# Patient Record
Sex: FEMALE | Race: WHITE | Employment: OTHER | RURAL
[De-identification: names, ages, dates, MRNs, and addresses within clinical notes are randomized per-mention and may not be internally consistent; named-entity substitution may affect disease eponyms.]

---

## 2019-05-06 ENCOUNTER — OFFICE VISIT (OUTPATIENT)
Dept: NEUROLOGY | Age: 84
End: 2019-05-06

## 2019-05-06 VITALS — HEART RATE: 80 BPM | OXYGEN SATURATION: 98 % | DIASTOLIC BLOOD PRESSURE: 51 MMHG | SYSTOLIC BLOOD PRESSURE: 131 MMHG

## 2019-05-06 DIAGNOSIS — G25.81 RLS (RESTLESS LEGS SYNDROME): Primary | ICD-10-CM

## 2019-05-06 RX ORDER — LEVOTHYROXINE SODIUM 50 UG/1
TABLET ORAL
COMMUNITY

## 2019-05-06 RX ORDER — ROPINIROLE 1 MG/1
TABLET, FILM COATED ORAL
Qty: 180 TAB | Refills: 3 | Status: SHIPPED | OUTPATIENT
Start: 2019-05-06 | End: 2019-11-26

## 2019-05-06 RX ORDER — MELATONIN
DAILY
COMMUNITY

## 2019-05-06 RX ORDER — MONTELUKAST SODIUM 4 MG/1
1 TABLET, CHEWABLE ORAL 3 TIMES DAILY
COMMUNITY
End: 2021-08-04

## 2019-05-06 RX ORDER — AZELASTINE 1 MG/ML
1 SPRAY, METERED NASAL 2 TIMES DAILY
COMMUNITY
End: 2021-08-04

## 2019-05-06 RX ORDER — MULTIVIT WITH MINERALS/HERBS
1 TABLET ORAL DAILY
COMMUNITY

## 2019-05-06 RX ORDER — GABAPENTIN 300 MG/1
300 CAPSULE ORAL 3 TIMES DAILY
COMMUNITY
End: 2021-08-04

## 2019-05-06 RX ORDER — FLAXSEED OIL 1000 MG
CAPSULE ORAL
COMMUNITY

## 2019-05-06 RX ORDER — BISMUTH SUBSALICYLATE 262 MG
2 TABLET,CHEWABLE ORAL DAILY
COMMUNITY
End: 2021-08-04

## 2019-05-06 NOTE — PROGRESS NOTES
HISTORY OF PRESENT ILLNESS  Mookie oSn is a 80 y.o. female. Patient is an 51-year-old woman who does not who comes in today complaining of What sounds like restless leg syndrome, she has a terrible discomfort the moment she gets in the bed and it just gets worse at night. She has been put on gabapentin 1800 mg nightly and it sedates her significantly at night and during the day. She is not sure whether it is helping with the restless legs or not. She gives a classic story for restless leg syndrome, the moment she gets in the bed and begin to ache and hurt and she feels she has to move them and becomes very very disconcerting. She is worried that the medication I put her on is not going to get approved as they had a great deal of trouble getting the gabapentin approved. She denies any bowel or bladder complaints, she denies any problems with her legs during the day when she is walking. When she sits down she feels some of the discomfort. Review of Systems   Constitutional:        Review of systems is positive for bilateral hearing loss moderate, she has some snoring. Complete review of systems done all is negative       Physical Exam  Constitutional: Oriented to person, place, and time, appears well-developed and well-nourished. No distress. HENT:   Head: Normocephalic and atraumatic. Mouth/Throat: Oropharynx is clear and moist. No oropharyngeal exudate. Eyes: Conjunctivae and EOM are normal. Pupils are equal, round, and reactive to light. No scleral icterus. Neck: Normal range of motion. Neck supple. No thyromegaly present. Cardiovascular: Normal rate, regular rhythm and normal heart sounds. Musculoskeletal: Normal range of motion, exhibits no edema, tenderness or deformity. Lymphadenopathy: no cervical adenopathy. Neurological: Alert and oriented to person, place, and time. Normal strength and normal reflexes. Displays no atrophy and no tremor.    No cranial nerve deficit or sensory deficit. Exhibits normal muscle tone. Displays a negative Romberg sign, no seizure activity. Coordination normal, gait normal.   No Babinski's sign on the right side. No Babinski's sign on the left side. Speech, language and mentation are normal  Visual fields are full to confrontation, funduscopic exam reveals flat discs, the retina and vasculature are normal   Skin: Skin is warm and dry. No rash noted, not diaphoretic. No erythema. Psychiatric: Normal mood and affect,  behavior is normal. Judgment and thought content normal.   Vitals reviewed. Current Outpatient Medications on File Prior to Visit   Medication Sig Dispense Refill    levothyroxine (SYNTHROID) 50 mcg tablet Take  by mouth Daily (before breakfast).  azelastine (ASTELIN) 137 mcg (0.1 %) nasal spray 1 Spray two (2) times a day. Use in each nostril as directed      colestipol (COLESTID) 1 gram tablet Take 1 g by mouth three (3) times daily.  teriparatide (FORTEO) 20 mcg/dose - 600 mcg/2.4 mL pnij injection 20 mcg by SubCUTAneous route daily.  multivitamin (ONE A DAY) tablet Take 1 Tab by mouth daily.  flaxseed oil 1,000 mg cap Take  by mouth.  b complex vitamins tablet Take 1 Tab by mouth daily.  calcium citrate/vitamin D3 (CALCIUM CITRATE + D PO) Take  by mouth daily.  cholecalciferol (VITAMIN D3) 1,000 unit tablet Take  by mouth daily.  gabapentin (NEURONTIN) 300 mg capsule Take 300 mg by mouth three (3) times daily. Takes 6 tabs at night for restless legs       No current facility-administered medications on file prior to visit. Past Medical History:   Diagnosis Date    Menopause      No family history on file.   Social History     Tobacco Use    Smoking status: Not on file   Substance Use Topics    Alcohol use: Not on file    Drug use: Not on file     /51   Pulse 80   SpO2 98%     ASSESSMENT and PLAN  RESTLESS LEGS  SYNDROME  This patient has classic restless leg syndrome, I am going to taper her gabapentin as it is not particularly effective and restless legs and it has high profile side effects at higher doses. I will taper it fairly quickly. I am to start her on Ropinirole 1 mg nightly, if that does not do the job she can move up to 2 mg at night. I have told her if she gets nauseated from the medication she can cut back, I have asked her to call us when she is on a steady dose and tell us whether it is helping. I will plan to see her back in 4 months.   COPD  Mild unrelated to above problem

## 2019-05-06 NOTE — PATIENT INSTRUCTIONS

## 2019-06-05 ENCOUNTER — TELEPHONE (OUTPATIENT)
Dept: NEUROLOGY | Age: 84
End: 2019-06-05

## 2019-10-15 RX ORDER — ROTIGOTINE 3 MG/24H
1 PATCH, EXTENDED RELEASE TRANSDERMAL EVERY 24 HOURS
COMMUNITY
Start: 2019-09-27

## 2019-10-15 RX ORDER — PREGABALIN 50 MG/1
25 CAPSULE ORAL DAILY
Refills: 2 | COMMUNITY
Start: 2019-10-04

## 2019-10-15 NOTE — PROGRESS NOTES
Odilon Dailey is a 80 y.o. female new patient today referred by Dr Mariaelena العراقي for [facility] [address].

## 2019-10-17 ENCOUNTER — OFFICE VISIT (OUTPATIENT)
Dept: ONCOLOGY | Age: 84
End: 2019-10-17

## 2019-10-17 VITALS
BODY MASS INDEX: 23.15 KG/M2 | WEIGHT: 122.6 LBS | HEIGHT: 61 IN | TEMPERATURE: 97.8 F | SYSTOLIC BLOOD PRESSURE: 135 MMHG | RESPIRATION RATE: 16 BRPM | OXYGEN SATURATION: 97 % | HEART RATE: 64 BPM | DIASTOLIC BLOOD PRESSURE: 61 MMHG

## 2019-10-17 DIAGNOSIS — D64.9 ANEMIA, UNSPECIFIED TYPE: Primary | ICD-10-CM

## 2019-10-17 RX ORDER — ASPIRIN 81 MG/1
384 TABLET ORAL DAILY
COMMUNITY
End: 2019-11-20

## 2019-10-17 NOTE — PROGRESS NOTES
Reason for Visit:   Alexandra Ramirez is a 80 y.o. female who is seen for eval of Erythromelaglia    Treatment History:   Dx: Anemia, Erythromelaglia    History of Present Illness:   79 yo here for eval of above. Began noticing redness and burning in her feet during the summer--Dr Driscoll prescribed asa which did help. Here for eval of associated conditions. Has anemia. Last colonoscopy was 2003--won't have another. Says the procedure was horrible. Lost weight during the summer but hasn't lost further. No change in waist size. No problems with infections. No early satiety. No unusual bleeding or bruising. Past Medical History:   Diagnosis Date    Asthma     Menopause       Past Surgical History:   Procedure Laterality Date    HX APPENDECTOMY      HX BREAST BIOPSY Left 1968    Benign    HX HEENT      tonsil out  at age 25      Social History     Tobacco Use    Smoking status: Former Smoker     Packs/day: 0.50     Years: 25.00     Pack years: 12.50     Types: Cigarettes    Smokeless tobacco: Never Used   Substance Use Topics    Alcohol use: Not Currently      Family History   Problem Relation Age of Onset    Dementia Mother     Osteoporosis Mother     Diabetes Father     Stroke Father     Arthritis-osteo Maternal Aunt     Stroke Paternal Grandfather      Current Outpatient Medications   Medication Sig    aspirin delayed-release 81 mg tablet Take 384 mg by mouth daily. Indications: taking 4 81 mg asa daily    LYRICA 50 mg capsule Take 1 Tab by mouth nightly.  NEUPRO 3 mg/24 hour patch Apply 1 Patch to affected area every twenty-four (24) hours.  levothyroxine (SYNTHROID) 50 mcg tablet Take  by mouth Daily (before breakfast).  azelastine (ASTELIN) 137 mcg (0.1 %) nasal spray 1 Spray two (2) times a day. Use in each nostril as directed    colestipol (COLESTID) 1 gram tablet Take 1 g by mouth three (3) times daily.     teriparatide (FORTEO) 20 mcg/dose - 600 mcg/2.4 mL pnij injection 20 mcg by SubCUTAneous route daily.  multivitamin (ONE A DAY) tablet Take 1 Tab by mouth daily.  flaxseed oil 1,000 mg cap Take  by mouth.  b complex vitamins tablet Take 1 Tab by mouth daily.  calcium citrate/vitamin D3 (CALCIUM CITRATE + D PO) Take  by mouth daily.  cholecalciferol (VITAMIN D3) 1,000 unit tablet Take  by mouth daily.  gabapentin (NEURONTIN) 300 mg capsule Take 300 mg by mouth three (3) times daily. Takes 6 tabs at night for restless legs    rOPINIRole (REQUIP) 1 mg tablet 1 po qhs prn RLs, may increase to 2 qhs if needed  Indications: Extreme Discomfort in Calves when Sitting or Lying Down     No current facility-administered medications for this visit. Allergies   Allergen Reactions    Other Medication Angioedema     Crest toothpaste    Seafood Anaphylaxis    Shellfish Derived Anaphylaxis    Soy Anaphylaxis    Soy Protein Anaphylaxis    Bacitracin Unknown (comments)     \"Prevents healing\"    Beef Containing Products Nausea and Vomiting    Beef Extract Diarrhea     Other reaction(s): Nausea and Vomiting    Milk Containing Products Nausea and Vomiting    Ropinirole Palpitations    Watermelon Nausea and Vomiting    Adhesive Tape-Silicones Rash    Sulfa (Sulfonamide Antibiotics) Rash        Review of Systems: A complete review of systems was obtained, negative except as described above.     Physical Exam:     Visit Vitals  /61   Pulse 64   Temp 97.8 °F (36.6 °C) (Oral)   Resp 16   Ht 5' 1\" (1.549 m)   Wt 122 lb 9.6 oz (55.6 kg)   SpO2 97%   BMI 23.17 kg/m²     ECOG PS: 0  General: No distress  Eyes: PERRLA, anicteric sclerae  HENT: Atraumatic, OP clear  Neck: Supple  Lymphatic: No cervical, supraclavicular, or inguinal adenopathy  Respiratory: CTAB, normal respiratory effort  CV: Normal rate, regular rhythm, no murmurs, no peripheral edema  GI: Soft, nontender, nondistended, no masses, no hepatomegaly, no splenomegaly  MS: Normal gait and station. Digits without clubbing or cyanosis. Skin: No rashes, ecchymoses, or petechiae. Normal temperature, turgor, and texture. Psych: Alert, oriented, appropriate affect, normal judgment/insight    Results:     Lab Results   Component Value Date/Time    WBC 9.7 05/13/2019 08:17 AM    HGB 10.7 (L) 05/13/2019 08:17 AM    HCT 32.7 (L) 05/13/2019 08:17 AM    PLATELET 578 69/26/6714 08:17 AM    MCV 96.2 05/13/2019 08:17 AM    ABS. NEUTROPHILS 7.6 05/13/2019 08:17 AM     Lab Results   Component Value Date/Time    Sodium 143 05/13/2019 08:17 AM    Potassium 3.6 05/13/2019 08:17 AM    Chloride 105 05/13/2019 08:17 AM    CO2 28 05/13/2019 08:17 AM    Glucose 110 (H) 05/13/2019 08:17 AM    BUN 18 05/13/2019 08:17 AM    Creatinine 1.41 (H) 05/13/2019 08:17 AM    GFR est AA 43 (L) 05/13/2019 08:17 AM    GFR est non-AA 36 (L) 05/13/2019 08:17 AM    Calcium 9.4 05/13/2019 08:17 AM     Lab Results   Component Value Date/Time    Bilirubin, total 0.3 05/13/2019 08:17 AM    ALT (SGPT) 23 05/13/2019 08:17 AM    AST (SGOT) 18 05/13/2019 08:17 AM    Alk. phosphatase 55 05/13/2019 08:17 AM    Protein, total 6.7 05/13/2019 08:17 AM    Albumin 3.2 (L) 05/13/2019 08:17 AM    Globulin 3.5 05/13/2019 08:17 AM           Assessment:   1) Anemia  2) Erythromelaglia      Plan:   1) Will do work up--MGUS/iron/b12/folate deficiency. Check retic for bone marrow response to the anemia. Will bring back in a month to discuss. 2) Can be associated with MPD--no evidence in peripheral blood--if needs marrow for the anemia work up it could show evidence of a MPD. Wait for labs to decide.     Signed By: Janette Snider MD

## 2019-11-13 NOTE — PROGRESS NOTES
Alirio Ruiz is a 80 y.o. female who is seen for f/u of [facility].   Denies complaints.         Patient had;  Mammo 4/25/2019  DEXA 6/19/2018

## 2019-11-14 ENCOUNTER — OFFICE VISIT (OUTPATIENT)
Dept: ONCOLOGY | Age: 84
End: 2019-11-14

## 2019-11-14 VITALS
SYSTOLIC BLOOD PRESSURE: 125 MMHG | HEIGHT: 61 IN | HEART RATE: 75 BPM | BODY MASS INDEX: 23.3 KG/M2 | TEMPERATURE: 97.8 F | WEIGHT: 123.4 LBS | RESPIRATION RATE: 16 BRPM | DIASTOLIC BLOOD PRESSURE: 70 MMHG | OXYGEN SATURATION: 97 %

## 2019-11-14 DIAGNOSIS — D64.9 ANEMIA, UNSPECIFIED TYPE: ICD-10-CM

## 2019-11-14 DIAGNOSIS — D64.9 ANEMIA, UNSPECIFIED TYPE: Primary | ICD-10-CM

## 2019-11-14 NOTE — PROGRESS NOTES
Reason for Visit:   Mindi Fitzgerald is a 80 y.o. female who is seen for eval of Erythromelaglia     Treatment History:   Dx: Anemia, Erythromelaglia    History of Present Illness:   Doing ok, no major changes. Labs were good in that no abnormalities were found to explain the anemia or erythromelaglia. But we still need to do more lab work to rule out myeloma and mds. Past Medical History:   Diagnosis Date    Asthma     Menopause       Past Surgical History:   Procedure Laterality Date    HX APPENDECTOMY      HX BREAST BIOPSY Left 1968    Benign    HX HEENT      tonsil out  at age 25      Social History     Tobacco Use    Smoking status: Former Smoker     Packs/day: 0.50     Years: 25.00     Pack years: 12.50     Types: Cigarettes    Smokeless tobacco: Never Used   Substance Use Topics    Alcohol use: Not Currently      Family History   Problem Relation Age of Onset    Dementia Mother     Osteoporosis Mother     Diabetes Father     Stroke Father     Arthritis-osteo Maternal Aunt     Stroke Paternal Grandfather      Current Outpatient Medications   Medication Sig    aspirin delayed-release 81 mg tablet Take 384 mg by mouth daily. Indications: taking 4 81 mg asa daily    LYRICA 50 mg capsule Take 1 Tab by mouth nightly.  NEUPRO 3 mg/24 hour patch Apply 1 Patch to affected area every twenty-four (24) hours.  levothyroxine (SYNTHROID) 50 mcg tablet Take  by mouth Daily (before breakfast).  azelastine (ASTELIN) 137 mcg (0.1 %) nasal spray 1 Spray two (2) times a day. Use in each nostril as directed    colestipol (COLESTID) 1 gram tablet Take 1 g by mouth three (3) times daily.  teriparatide (FORTEO) 20 mcg/dose - 600 mcg/2.4 mL pnij injection 20 mcg by SubCUTAneous route daily.  multivitamin (ONE A DAY) tablet Take 1 Tab by mouth daily.  flaxseed oil 1,000 mg cap Take  by mouth.  b complex vitamins tablet Take 1 Tab by mouth daily.     calcium citrate/vitamin D3 (CALCIUM CITRATE + D PO) Take  by mouth daily.  cholecalciferol (VITAMIN D3) 1,000 unit tablet Take  by mouth daily.  gabapentin (NEURONTIN) 300 mg capsule Take 300 mg by mouth three (3) times daily. Takes 6 tabs at night for restless legs    rOPINIRole (REQUIP) 1 mg tablet 1 po qhs prn RLs, may increase to 2 qhs if needed  Indications: Extreme Discomfort in Calves when Sitting or Lying Down     No current facility-administered medications for this visit. Allergies   Allergen Reactions    Other Medication Angioedema     Crest toothpaste    Seafood Anaphylaxis    Shellfish Derived Anaphylaxis    Soy Anaphylaxis    Soy Protein Anaphylaxis    Bacitracin Unknown (comments)     \"Prevents healing\"    Beef Containing Products Nausea and Vomiting    Beef Extract Diarrhea     Other reaction(s): Nausea and Vomiting    Milk Containing Products Nausea and Vomiting    Ropinirole Palpitations    Watermelon Nausea and Vomiting    Adhesive Tape-Silicones Rash    Sulfa (Sulfonamide Antibiotics) Rash        Review of Systems: A complete review of systems was obtained, negative except as described above. Physical Exam:   There were no vitals taken for this visit. ECOG PS: 1  General: No distress  Eyes: PERRLA, anicteric sclerae  HENT: Atraumatic, OP clear  Neck: Supple  Lymphatic: No cervical, supraclavicular, or inguinal adenopathy  Respiratory: CTAB, normal respiratory effort  CV: Normal rate, regular rhythm, no murmurs, no peripheral edema  GI: Soft, nontender, nondistended, no masses, no hepatomegaly, no splenomegaly  MS: Normal gait and station. Digits without clubbing or cyanosis. Skin: No rashes, ecchymoses, or petechiae. Normal temperature, turgor, and texture.   Psych: Alert, oriented, appropriate affect, normal judgment/insight    Results:     Lab Results   Component Value Date/Time    WBC 10.4 10/17/2019 02:50 PM    HGB 10.7 (L) 10/17/2019 02:50 PM    HCT 33.2 (L) 10/17/2019 02:50 PM    PLATELET 463 10/17/2019 02:50 PM    MCV 97.4 10/17/2019 02:50 PM    ABS. NEUTROPHILS 7.1 10/17/2019 02:50 PM     Lab Results   Component Value Date/Time    Sodium 139 10/17/2019 02:50 PM    Potassium 4.6 10/17/2019 02:50 PM    Chloride 103 10/17/2019 02:50 PM    CO2 28 10/17/2019 02:50 PM    Glucose 84 10/17/2019 02:50 PM    BUN 28 (H) 10/17/2019 02:50 PM    Creatinine 1.28 (H) 10/17/2019 02:50 PM    GFR est AA 48 (L) 10/17/2019 02:50 PM    GFR est non-AA 40 (L) 10/17/2019 02:50 PM    Calcium 9.4 10/17/2019 02:50 PM     Lab Results   Component Value Date/Time    Bilirubin, total 0.2 10/17/2019 02:50 PM    ALT (SGPT) 26 10/17/2019 02:50 PM    AST (SGOT) 23 10/17/2019 02:50 PM    Alk. phosphatase 82 10/17/2019 02:50 PM    Protein, total 6.9 10/17/2019 02:50 PM    Protein, total 6.4 10/17/2019 02:50 PM    Albumin 3.6 10/17/2019 02:50 PM    Globulin 3.3 10/17/2019 02:50 PM     SPEP/PAUL/FLC--no monoclonal process  Iron, B12, Folate all normal  Retic Count inappropriately normal    Colonoscopy 2013--clear--will not have another    Assessment:   1) Anemia  2) Erythromelaglia        Plan:   1) Get UPEP/PAUL as well as Bone Marrow biopsy to rule out Myeloma and MDS--back in one month to discuss testing. 2) Can be associated with MPD--no evidence in peripheral blood.  See if Marrow has any findings      Signed By: Margie Prajapati MD

## 2019-11-26 ENCOUNTER — HOSPITAL ENCOUNTER (OUTPATIENT)
Dept: CT IMAGING | Age: 84
Discharge: HOME | End: 2019-11-26
Attending: INTERNAL MEDICINE
Payer: MEDICARE

## 2019-11-26 VITALS
HEART RATE: 67 BPM | DIASTOLIC BLOOD PRESSURE: 60 MMHG | WEIGHT: 122 LBS | HEIGHT: 61 IN | TEMPERATURE: 98 F | BODY MASS INDEX: 23.03 KG/M2 | RESPIRATION RATE: 16 BRPM | SYSTOLIC BLOOD PRESSURE: 128 MMHG | OXYGEN SATURATION: 97 %

## 2019-11-26 DIAGNOSIS — D64.9 ANEMIA, UNSPECIFIED TYPE: ICD-10-CM

## 2019-11-26 LAB
BASOPHILS # BLD: 0 K/UL (ref 0–0.1)
BASOPHILS NFR BLD: 1 % (ref 0–1)
DIFFERENTIAL METHOD BLD: ABNORMAL
EOSINOPHIL # BLD: 0.1 K/UL (ref 0–0.4)
EOSINOPHIL NFR BLD: 1 % (ref 0–7)
ERYTHROCYTE [DISTWIDTH] IN BLOOD BY AUTOMATED COUNT: 13.9 % (ref 11.5–14.5)
HCT VFR BLD AUTO: 34 % (ref 35–47)
HGB BLD-MCNC: 10.9 G/DL (ref 11.5–16)
IMM GRANULOCYTES # BLD AUTO: 0 K/UL (ref 0–0.04)
IMM GRANULOCYTES NFR BLD AUTO: 0 % (ref 0–0.5)
LYMPHOCYTES # BLD: 1.6 K/UL (ref 0.8–3.5)
LYMPHOCYTES NFR BLD: 21 % (ref 12–49)
MCH RBC QN AUTO: 32.3 PG (ref 26–34)
MCHC RBC AUTO-ENTMCNC: 32.1 G/DL (ref 30–36.5)
MCV RBC AUTO: 100.9 FL (ref 80–99)
MONOCYTES # BLD: 0.6 K/UL (ref 0–1)
MONOCYTES NFR BLD: 8 % (ref 5–13)
NEUTS SEG # BLD: 5.2 K/UL (ref 1.8–8)
NEUTS SEG NFR BLD: 69 % (ref 32–75)
NRBC # BLD: 0 K/UL (ref 0–0.01)
NRBC BLD-RTO: 0 PER 100 WBC
PLATELET # BLD AUTO: 225 K/UL (ref 150–400)
PMV BLD AUTO: 10.5 FL (ref 8.9–12.9)
RBC # BLD AUTO: 3.37 M/UL (ref 3.8–5.2)
WBC # BLD AUTO: 7.5 K/UL (ref 3.6–11)

## 2019-11-26 PROCEDURE — 88305 TISSUE EXAM BY PATHOLOGIST: CPT

## 2019-11-26 PROCEDURE — 88264 CHROMOSOME ANALYSIS 20-25: CPT

## 2019-11-26 PROCEDURE — 77030003375 HC MRK BIOP BEEK -A

## 2019-11-26 PROCEDURE — 77030028872 HC BN BIOP NDL ON CNTRL TY TELE -C

## 2019-11-26 PROCEDURE — 88185 FLOWCYTOMETRY/TC ADD-ON: CPT

## 2019-11-26 PROCEDURE — 88311 DECALCIFY TISSUE: CPT

## 2019-11-26 PROCEDURE — 38221 DX BONE MARROW BIOPSIES: CPT

## 2019-11-26 PROCEDURE — 74011250636 HC RX REV CODE- 250/636: Performed by: RADIOLOGY

## 2019-11-26 PROCEDURE — 36415 COLL VENOUS BLD VENIPUNCTURE: CPT

## 2019-11-26 PROCEDURE — 85025 COMPLETE CBC W/AUTO DIFF WBC: CPT

## 2019-11-26 PROCEDURE — 88237 TISSUE CULTURE BONE MARROW: CPT

## 2019-11-26 PROCEDURE — 88374 M/PHMTRC ALYS ISHQUANT/SEMIQ: CPT

## 2019-11-26 PROCEDURE — 88313 SPECIAL STAINS GROUP 2: CPT

## 2019-11-26 PROCEDURE — 77030014115

## 2019-11-26 PROCEDURE — 88184 FLOWCYTOMETRY/ TC 1 MARKER: CPT

## 2019-11-26 PROCEDURE — 77030003666 HC NDL SPINAL BD -A

## 2019-11-26 RX ORDER — MIDAZOLAM HYDROCHLORIDE 1 MG/ML
5 INJECTION, SOLUTION INTRAMUSCULAR; INTRAVENOUS
Status: DISCONTINUED | OUTPATIENT
Start: 2019-11-26 | End: 2019-11-26

## 2019-11-26 RX ORDER — FENTANYL CITRATE 50 UG/ML
100 INJECTION, SOLUTION INTRAMUSCULAR; INTRAVENOUS
Status: DISCONTINUED | OUTPATIENT
Start: 2019-11-26 | End: 2019-11-26

## 2019-11-26 RX ORDER — SODIUM CHLORIDE 9 MG/ML
25 INJECTION, SOLUTION INTRAVENOUS CONTINUOUS
Status: DISCONTINUED | OUTPATIENT
Start: 2019-11-26 | End: 2019-11-26

## 2019-11-26 RX ADMIN — MIDAZOLAM 1 MG: 1 INJECTION INTRAMUSCULAR; INTRAVENOUS at 12:45

## 2019-11-26 RX ADMIN — FENTANYL CITRATE 25 MCG: 50 INJECTION, SOLUTION INTRAMUSCULAR; INTRAVENOUS at 12:45

## 2019-11-26 RX ADMIN — MIDAZOLAM 1 MG: 1 INJECTION INTRAMUSCULAR; INTRAVENOUS at 12:38

## 2019-11-26 RX ADMIN — FENTANYL CITRATE 25 MCG: 50 INJECTION, SOLUTION INTRAMUSCULAR; INTRAVENOUS at 12:47

## 2019-11-26 RX ADMIN — FENTANYL CITRATE 50 MCG: 50 INJECTION, SOLUTION INTRAMUSCULAR; INTRAVENOUS at 12:38

## 2019-11-26 RX ADMIN — SODIUM CHLORIDE 25 ML/HR: 900 INJECTION, SOLUTION INTRAVENOUS at 12:36

## 2019-11-26 NOTE — ROUTINE PROCESS
1120- Pt in for bone marrow biopsy. Workup completed. 65- Dr Venecia Silva in to assess pt. Pt aware of risks and benefits and wishes to proceed. Discharge instructions reviewed with pt. Pt verbalized understanding.       1238- Sedation Start  Fentanyl 100mcg  Versed 2mg

## 2019-11-26 NOTE — DISCHARGE INSTRUCTIONS
[id number]  Radiology Department  [phone]    Radiologist:     Date:         Bone Marrow Biopsy Discharge Instructions      Go home and rest  and restrict your activity the next 24 hours. You have been given sedating medications, so do not drive or drink alcohol today. Resume your previous diet and medications. You may shower in 24 hours. Do not soak or swim until the biopsy site has healed completely to minimize any risk of infection. Watch site for redness, drainage, pus, foul odor, increasing pain and fevers. Should this occur call you doctor immediatly. You may take Tylenol, as directed on the label, for pain or discomfort. Avoid Ibuprofen (Advil, Motrin etc.) and Aspirin today as they may increase your risk of bleeding. Be sure to follow up with your physician, and let him know how you are progressing. Your results will be sent to your physician as soon as they become available.       I

## 2019-11-26 NOTE — H&P
Radiology History and Physical    Patient: Santino Mojica 80 y.o. female       Chief Complaint: No chief complaint on file.       History of Present Illness: ct guided bone marrow aspirate and biopsy    History:    Past Medical History:   Diagnosis Date    Asthma     Menopause      Family History   Problem Relation Age of Onset    Dementia Mother     Osteoporosis Mother     Diabetes Father     Stroke Father     Arthritis-osteo Maternal Aunt     Stroke Paternal Grandfather      Social History     Socioeconomic History    Marital status:      Spouse name: Not on file    Number of children: Not on file    Years of education: Not on file    Highest education level: Not on file   Occupational History    Not on file   Social Needs    Financial resource strain: Not on file    Food insecurity:     Worry: Not on file     Inability: Not on file    Transportation needs:     Medical: Not on file     Non-medical: Not on file   Tobacco Use    Smoking status: Former Smoker     Packs/day: 0.50     Years: 25.00     Pack years: 12.50     Types: Cigarettes    Smokeless tobacco: Never Used   Substance and Sexual Activity    Alcohol use: Not Currently    Drug use: Never    Sexual activity: Not on file   Lifestyle    Physical activity:     Days per week: Not on file     Minutes per session: Not on file    Stress: Not on file   Relationships    Social connections:     Talks on phone: Not on file     Gets together: Not on file     Attends Gnosticism service: Not on file     Active member of club or organization: Not on file     Attends meetings of clubs or organizations: Not on file     Relationship status: Not on file    Intimate partner violence:     Fear of current or ex partner: Not on file     Emotionally abused: Not on file     Physically abused: Not on file     Forced sexual activity: Not on file   Other Topics Concern     Service No    Blood Transfusions No    Caffeine Concern Not Asked    Occupational Exposure No    Hobby Hazards Not Asked    Sleep Concern Yes    Stress Concern Not Asked    Weight Concern Not Asked    Special Diet No    Back Care Not Asked    Exercise Yes    Bike Helmet Not Asked    Seat Belt Yes    Self-Exams Yes   Social History Narrative    Not on file       Allergies: Allergies   Allergen Reactions    Other Medication Angioedema     Crest toothpaste    Seafood Anaphylaxis    Shellfish Derived Anaphylaxis    Soy Anaphylaxis    Soy Protein Anaphylaxis    Soybean Oil Anaphylaxis    Bacitracin Unknown (comments)     \"Prevents healing\"    Beef Containing Products Nausea and Vomiting    Beef Extract Diarrhea     Other reaction(s): Nausea and Vomiting    Milk Containing Products Nausea and Vomiting    Ropinirole Palpitations    Watermelon Nausea and Vomiting    Adhesive Tape-Silicones Rash     Byrd-Keven syndrome    Sulfa (Sulfonamide Antibiotics) Rash       Current Medications:  Current Outpatient Medications   Medication Sig    vit A/vit C/vit E/zinc/copper (PRESERVISION AREDS PO) Take 2 Tabs by mouth daily.  LYRICA 50 mg capsule Take 1 Tab by mouth nightly.  NEUPRO 3 mg/24 hour patch Apply 1 Patch to affected area every twenty-four (24) hours.  levothyroxine (SYNTHROID) 50 mcg tablet Take  by mouth Daily (before breakfast).  azelastine (ASTELIN) 137 mcg (0.1 %) nasal spray 1 Spray two (2) times a day. Use in each nostril as directed    colestipol (COLESTID) 1 gram tablet Take 1 g by mouth three (3) times daily.  teriparatide (FORTEO) 20 mcg/dose - 600 mcg/2.4 mL pnij injection 20 mcg by SubCUTAneous route daily.  multivitamin (ONE A DAY) tablet Take 2 Tabs by mouth daily.  flaxseed oil 1,000 mg cap Take  by mouth.  b complex vitamins tablet Take 1 Tab by mouth daily.  calcium citrate/vitamin D3 (CALCIUM CITRATE + D PO) Take  by mouth daily.  cholecalciferol (VITAMIN D3) 1,000 unit tablet Take  by mouth daily.     gabapentin (NEURONTIN) 300 mg capsule Take 300 mg by mouth three (3) times daily. Takes 6 tabs at night for restless legs     No current facility-administered medications for this encounter. Physical Exam:  Blood pressure 128/60, pulse 67, temperature 98 °F (36.7 °C), resp. rate 16, height 5' 1\" (1.549 m), weight 55.3 kg (122 lb), SpO2 97 %, not currently breastfeeding. LUNG: clear to auscultation bilaterally, HEART: regular rate and rhythm      Alerts:    Hospital Problems  Date Reviewed: 11/14/2019    None          Laboratory:      Recent Labs     11/26/19  1105   HGB 10.9*   HCT 34.0*   WBC 7.5            Plan of Care/Planned Procedure:  Risks, benefits, and alternatives reviewed with patient and she agrees to proceed with the procedure.        Shasta Allen MD

## 2019-12-13 ENCOUNTER — OFFICE VISIT (OUTPATIENT)
Dept: ONCOLOGY | Age: 84
End: 2019-12-13

## 2019-12-13 VITALS
HEIGHT: 61 IN | SYSTOLIC BLOOD PRESSURE: 136 MMHG | WEIGHT: 125 LBS | BODY MASS INDEX: 23.6 KG/M2 | OXYGEN SATURATION: 96 % | DIASTOLIC BLOOD PRESSURE: 75 MMHG | RESPIRATION RATE: 18 BRPM | HEART RATE: 64 BPM | TEMPERATURE: 97.8 F

## 2019-12-13 DIAGNOSIS — D64.9 ANEMIA, UNSPECIFIED TYPE: Primary | ICD-10-CM

## 2019-12-13 NOTE — PROGRESS NOTES
Reason for Visit:   [address] 80 y. o. female who is seen for eval of Erythromelaglia     Treatment History:   Dx: Anemia, Erythromelaglia     History of Present Illness:   Doing well, no major changes    Past Medical History:   Diagnosis Date    Asthma     Menopause       Past Surgical History:   Procedure Laterality Date    HX APPENDECTOMY      HX BREAST BIOPSY Left 1968    Benign    HX HEENT      tonsil out  at age 25      Social History     Tobacco Use    Smoking status: Former Smoker     Packs/day: 0.50     Years: 25.00     Pack years: 12.50     Types: Cigarettes    Smokeless tobacco: Never Used   Substance Use Topics    Alcohol use: Not Currently      Family History   Problem Relation Age of Onset    Dementia Mother     Osteoporosis Mother     Diabetes Father     Stroke Father     Arthritis-osteo Maternal Aunt     Stroke Paternal Grandfather      Current Outpatient Medications   Medication Sig    vit A/vit C/vit E/zinc/copper (PRESERVISION AREDS PO) Take 2 Tabs by mouth daily.  LYRICA 50 mg capsule Take 1 Tab by mouth nightly.  NEUPRO 3 mg/24 hour patch Apply 1 Patch to affected area every twenty-four (24) hours.  levothyroxine (SYNTHROID) 50 mcg tablet Take  by mouth Daily (before breakfast).  azelastine (ASTELIN) 137 mcg (0.1 %) nasal spray 1 Spray two (2) times a day. Use in each nostril as directed    colestipol (COLESTID) 1 gram tablet Take 1 g by mouth three (3) times daily.  teriparatide (FORTEO) 20 mcg/dose - 600 mcg/2.4 mL pnij injection 20 mcg by SubCUTAneous route daily.  multivitamin (ONE A DAY) tablet Take 2 Tabs by mouth daily.  flaxseed oil 1,000 mg cap Take  by mouth.  b complex vitamins tablet Take 1 Tab by mouth daily.  calcium citrate/vitamin D3 (CALCIUM CITRATE + D PO) Take  by mouth daily.  cholecalciferol (VITAMIN D3) 1,000 unit tablet Take  by mouth daily.     gabapentin (NEURONTIN) 300 mg capsule Take 300 mg by mouth three (3) times daily. Takes 6 tabs at night for restless legs     No current facility-administered medications for this visit. Allergies   Allergen Reactions    Other Medication Angioedema     Crest toothpaste    Seafood Anaphylaxis    Shellfish Derived Anaphylaxis    Soy Anaphylaxis    Soy Protein Anaphylaxis    Soybean Oil Anaphylaxis    Bacitracin Unknown (comments)     \"Prevents healing\"    Beef Containing Products Nausea and Vomiting    Beef Extract Diarrhea     Other reaction(s): Nausea and Vomiting    Milk Containing Products Nausea and Vomiting    Ropinirole Palpitations    Watermelon Nausea and Vomiting    Adhesive Tape-Silicones Rash     Byrd-Keven syndrome    Sulfa (Sulfonamide Antibiotics) Rash        Review of Systems: A complete review of systems was obtained, negative except as described above. Physical Exam:     Visit Vitals  /75   Pulse 64   Temp 97.8 °F (36.6 °C)   Resp 18   Ht 5' 1\" (1.549 m)   Wt 125 lb (56.7 kg)   SpO2 96%   BMI 23.62 kg/m²     ECOG PS: 0  General: No distress  Eyes: PERRLA, anicteric sclerae  HENT: Atraumatic, OP clear  Neck: Supple  Lymphatic: No cervical, supraclavicular, or inguinal adenopathy  Respiratory: CTAB, normal respiratory effort  CV: Normal rate, regular rhythm, no murmurs, no peripheral edema  GI: Soft, nontender, nondistended, no masses, no hepatomegaly, no splenomegaly  MS: Normal gait and station. Digits without clubbing or cyanosis. Skin: No rashes, ecchymoses, or petechiae. Normal temperature, turgor, and texture. Psych: Alert, oriented, appropriate affect, normal judgment/insight    Results:     Lab Results   Component Value Date/Time    WBC 7.5 11/26/2019 11:05 AM    HGB 10.9 (L) 11/26/2019 11:05 AM    HCT 34.0 (L) 11/26/2019 11:05 AM    PLATELET 862 66/28/5079 11:05 AM    .9 (H) 11/26/2019 11:05 AM    ABS.  NEUTROPHILS 5.2 11/26/2019 11:05 AM     Lab Results   Component Value Date/Time    Sodium 139 10/17/2019 02:50 PM Potassium 4.6 10/17/2019 02:50 PM    Chloride 103 10/17/2019 02:50 PM    CO2 28 10/17/2019 02:50 PM    Glucose 84 10/17/2019 02:50 PM    BUN 28 (H) 10/17/2019 02:50 PM    Creatinine 1.28 (H) 10/17/2019 02:50 PM    GFR est AA 48 (L) 10/17/2019 02:50 PM    GFR est non-AA 40 (L) 10/17/2019 02:50 PM    Calcium 9.4 10/17/2019 02:50 PM     Lab Results   Component Value Date/Time    Bilirubin, total 0.2 10/17/2019 02:50 PM    ALT (SGPT) 26 10/17/2019 02:50 PM    AST (SGOT) 23 10/17/2019 02:50 PM    Alk. phosphatase 82 10/17/2019 02:50 PM    Protein, total 6.9 10/17/2019 02:50 PM    Protein, total 6.4 10/17/2019 02:50 PM    Albumin 3.6 10/17/2019 02:50 PM    Globulin 3.3 10/17/2019 02:50 PM       SPEP/PAUL/FLC--no monoclonal process  Iron, B12, Folate all normal  Retic Count inappropriately normal     Colonoscopy 2013--clear--will not have another    Bone Marrow Biopsy 11/26/2019  FINAL PATHOLOGIC DIAGNOSIS   Bone marrow, posterior iliac crest, aspirate, clot section, and decalcified core biopsy:   Normocellular marrow maturing trilineage hematopoiesis   Flow cytometry shows no diagnostic immunophenotypic abnormalities   Cytogenetics Normal Female 46XX     Assessment:   1) Anemia  2) Erythromelaglia        Plan:   1) Ruled out MGUS/MDS/Myeloma--could be anemia from CKD. No blood losses that shes aware. Will bring back in 4 months, if still anemic will check for AIHA.   2) Can be associated with MPD--no evidence in peripheral blood or bone marrow.          Signed By: Osiel Bernal MD

## 2019-12-13 NOTE — PROGRESS NOTES
Pt is here for routine follow , she is without complaints except she had a fall recently and sprained her foot, but is feeling much better now. Visit Vitals  /75   Pulse 64   Temp 97.8 °F (36.6 °C)   Resp 18   Ht 5' 1\" (1.549 m)   Wt 125 lb (56.7 kg)   SpO2 96%   BMI 23.62 kg/m²       Pain Scale: 0 - No pain/10  Pain Location:     1. Have you been to the ER, urgent care clinic since your last visit? Hospitalized since your last visit? no    2. Have you seen or consulted any other health care providers outside of the [address] [place] [address] since your last visit? Include any pap smears or colon screening.   No    Health Maintenance reviewed

## 2020-07-15 ENCOUNTER — OFFICE VISIT (OUTPATIENT)
Dept: ONCOLOGY | Age: 85
End: 2020-07-15

## 2020-07-15 VITALS
HEIGHT: 61 IN | DIASTOLIC BLOOD PRESSURE: 78 MMHG | RESPIRATION RATE: 18 BRPM | BODY MASS INDEX: 25.11 KG/M2 | SYSTOLIC BLOOD PRESSURE: 147 MMHG | HEART RATE: 63 BPM | TEMPERATURE: 98.3 F | OXYGEN SATURATION: 97 % | WEIGHT: 133 LBS

## 2020-07-15 DIAGNOSIS — D64.9 ANEMIA, UNSPECIFIED TYPE: Primary | ICD-10-CM

## 2020-07-15 NOTE — PROGRESS NOTES
Reason for Visit:   Adam Chandler a 80 y. o. female who is seen for eval of Erythromelaglia     Treatment History:   Dx: Anemia, Erythromelaglia     History of Present Illness:   No issues overall, doing very well    Past Medical History:   Diagnosis Date    Asthma     Menopause       Past Surgical History:   Procedure Laterality Date    HX APPENDECTOMY      HX BREAST BIOPSY Left 1968    Benign    HX HEENT      tonsil out  at age 25      Social History     Tobacco Use    Smoking status: Former Smoker     Packs/day: 0.50     Years: 25.00     Pack years: 12.50     Types: Cigarettes    Smokeless tobacco: Never Used   Substance Use Topics    Alcohol use: Not Currently      Family History   Problem Relation Age of Onset    Dementia Mother     Osteoporosis Mother     Diabetes Father     Stroke Father     Arthritis-osteo Maternal Aunt     Stroke Paternal Grandfather      Current Outpatient Medications   Medication Sig    vit A/vit C/vit E/zinc/copper (PRESERVISION AREDS PO) Take 2 Tabs by mouth daily.  LYRICA 50 mg capsule Take 1 Tab by mouth nightly.  NEUPRO 3 mg/24 hour patch Apply 1 Patch to affected area every twenty-four (24) hours.  levothyroxine (SYNTHROID) 50 mcg tablet Take  by mouth Daily (before breakfast).  teriparatide (FORTEO) 20 mcg/dose - 600 mcg/2.4 mL pnij injection 20 mcg by SubCUTAneous route daily.  multivitamin (ONE A DAY) tablet Take 2 Tabs by mouth daily.  flaxseed oil 1,000 mg cap Take  by mouth.  b complex vitamins tablet Take 1 Tab by mouth daily.  calcium citrate/vitamin D3 (CALCIUM CITRATE + D PO) Take  by mouth daily.  cholecalciferol (VITAMIN D3) 1,000 unit tablet Take  by mouth daily.  azelastine (ASTELIN) 137 mcg (0.1 %) nasal spray 1 Spray two (2) times a day. Use in each nostril as directed    colestipol (COLESTID) 1 gram tablet Take 1 g by mouth three (3) times daily.     gabapentin (NEURONTIN) 300 mg capsule Take 300 mg by mouth three (3) times daily. Takes 6 tabs at night for restless legs     No current facility-administered medications for this visit. Allergies   Allergen Reactions    Other Medication Angioedema     Crest toothpaste    Seafood Anaphylaxis    Shellfish Derived Anaphylaxis    Soy Anaphylaxis    Soy Protein Anaphylaxis    Soybean Oil Anaphylaxis    Bacitracin Unknown (comments)     \"Prevents healing\"    Beef Containing Products Nausea and Vomiting    Beef Extract Diarrhea     Other reaction(s): Nausea and Vomiting    Milk Containing Products Nausea and Vomiting    Ropinirole Palpitations    Watermelon Nausea and Vomiting    Adhesive Tape-Silicones Rash     Byrd-Keven syndrome    Sulfa (Sulfonamide Antibiotics) Rash        Review of Systems: A complete review of systems was obtained, negative except as described above. Physical Exam:     Visit Vitals  /78   Pulse 63   Temp 98.3 °F (36.8 °C)   Resp 18   Ht 5' 1\" (1.549 m)   Wt 133 lb (60.3 kg)   SpO2 97%   BMI 25.13 kg/m²     ECOG PS: 1  General: No distress  Eyes: PERRLA, anicteric sclerae  HENT: Atraumatic, OP clear  Neck: Supple  Lymphatic: No cervical, supraclavicular, or inguinal adenopathy  Respiratory: CTAB, normal respiratory effort  CV: Normal rate, regular rhythm, no murmurs, no peripheral edema  GI: Soft, nontender, nondistended, no masses, no hepatomegaly, no splenomegaly  MS: Normal gait and station. Digits without clubbing or cyanosis. Skin: No rashes, ecchymoses, or petechiae. Normal temperature, turgor, and texture. Psych: Alert, oriented, appropriate affect, normal judgment/insight    Results:     Lab Results   Component Value Date/Time    WBC 7.5 11/26/2019 11:05 AM    HGB 10.9 (L) 11/26/2019 11:05 AM    HCT 34.0 (L) 11/26/2019 11:05 AM    PLATELET 912 64/30/1765 11:05 AM    .9 (H) 11/26/2019 11:05 AM    ABS.  NEUTROPHILS 5.2 11/26/2019 11:05 AM     Lab Results   Component Value Date/Time    Sodium 139 10/17/2019 02:50 PM    Potassium 4.6 10/17/2019 02:50 PM    Chloride 103 10/17/2019 02:50 PM    CO2 28 10/17/2019 02:50 PM    Glucose 84 10/17/2019 02:50 PM    BUN 28 (H) 10/17/2019 02:50 PM    Creatinine 1.28 (H) 10/17/2019 02:50 PM    GFR est AA 48 (L) 10/17/2019 02:50 PM    GFR est non-AA 40 (L) 10/17/2019 02:50 PM    Calcium 9.4 10/17/2019 02:50 PM     Lab Results   Component Value Date/Time    Bilirubin, total 0.2 10/17/2019 02:50 PM    ALT (SGPT) 26 10/17/2019 02:50 PM    Alk. phosphatase 82 10/17/2019 02:50 PM    Protein, total 6.9 10/17/2019 02:50 PM    Protein, total 6.4 10/17/2019 02:50 PM    Albumin 3.6 10/17/2019 02:50 PM    Globulin 3.3 10/17/2019 02:50 PM       SPEP/PAUL/FLC--no monoclonal process  Iron, B12, Folate all normal  Retic Count inappropriately normal     Colonoscopy 2013--clear--will not have another     Bone Marrow Biopsy 11/26/2019  FINAL PATHOLOGIC DIAGNOSIS   Bone marrow, posterior iliac crest, aspirate, clot section, and decalcified core biopsy:   Normocellular marrow maturing trilineage hematopoiesis   Flow cytometry shows no diagnostic immunophenotypic abnormalities   Cytogenetics Normal Female 46XX     Assessment:   1) Anemia  2) Erythromelaglia        Plan:   1) Ruled out MGUS/MDS/Myeloma--could be anemia from CKD. No blood losses that shes aware. Will bring back in 4 months, will check for AIHA.   2) Can be associated with MPD--no evidence in peripheral blood or bone marrow.          Signed By: Shelly Puente MD

## 2020-07-15 NOTE — PROGRESS NOTES
Pt is her for routine follow up, she reports feeling well, no new complaints. Visit Vitals  /78   Pulse 63   Temp 98.3 °F (36.8 °C)   Resp 18   Ht 5' 1\" (1.549 m)   Wt 133 lb (60.3 kg)   SpO2 97%   BMI 25.13 kg/m²       Pain Scale: 0 - No pain/10  Pain Location:       1. Have you been to the ER, urgent care clinic since your last visit? Hospitalized since your last visit? no    2. Have you seen or consulted any other health care providers outside of the [address] [place] [address] since your last visit? Include any pap smears or colon screening.   No    Health Maintenance reviewed

## 2020-08-12 ENCOUNTER — OFFICE VISIT (OUTPATIENT)
Dept: ONCOLOGY | Age: 85
End: 2020-08-12

## 2020-08-12 VITALS
RESPIRATION RATE: 16 BRPM | OXYGEN SATURATION: 97 % | TEMPERATURE: 97.6 F | SYSTOLIC BLOOD PRESSURE: 146 MMHG | BODY MASS INDEX: 25.98 KG/M2 | HEART RATE: 64 BPM | DIASTOLIC BLOOD PRESSURE: 62 MMHG | WEIGHT: 137.6 LBS | HEIGHT: 61 IN

## 2020-08-12 DIAGNOSIS — D64.9 ANEMIA, UNSPECIFIED TYPE: Primary | ICD-10-CM

## 2020-08-12 RX ORDER — DEXTROMETHORPHAN HYDROBROMIDE, GUAIFENESIN 5; 100 MG/5ML; MG/5ML
1300 LIQUID ORAL
COMMUNITY

## 2020-08-12 NOTE — PROGRESS NOTES
Reason for Visit:   [address] 80 y. o. female who is seen for eval of Erythromelaglia     Treatment History:   Dx: Anemia, Erythromelaglia    History of Present Illness:   Doing well, no changes, some dizziness with standing--attributes to the lyrica. Sleep continues to be disturbed and sporadic. Has increased appetite and weight. No signs of bleeing or blood loss    Past Medical History:   Diagnosis Date    Asthma     Menopause       Past Surgical History:   Procedure Laterality Date    HX APPENDECTOMY      HX BREAST BIOPSY Left 1963    Benign    HX HEENT      tonsil out  at age 25      Social History     Tobacco Use    Smoking status: Former Smoker     Packs/day: 0.50     Years: 25.00     Pack years: 12.50     Types: Cigarettes    Smokeless tobacco: Never Used   Substance Use Topics    Alcohol use: Not Currently      Family History   Problem Relation Age of Onset    Dementia Mother     Osteoporosis Mother     Diabetes Father     Stroke Father     Arthritis-osteo Maternal Aunt     Stroke Paternal Grandfather      Current Outpatient Medications   Medication Sig    vit A/vit C/vit E/zinc/copper (PRESERVISION AREDS PO) Take 2 Tabs by mouth daily.  LYRICA 50 mg capsule Take 1 Tab by mouth nightly.  NEUPRO 3 mg/24 hour patch Apply 1 Patch to affected area every twenty-four (24) hours.  levothyroxine (SYNTHROID) 50 mcg tablet Take  by mouth Daily (before breakfast).  azelastine (ASTELIN) 137 mcg (0.1 %) nasal spray 1 Spray two (2) times a day. Use in each nostril as directed    colestipol (COLESTID) 1 gram tablet Take 1 g by mouth three (3) times daily.  teriparatide (FORTEO) 20 mcg/dose - 600 mcg/2.4 mL pnij injection 20 mcg by SubCUTAneous route daily.  multivitamin (ONE A DAY) tablet Take 2 Tabs by mouth daily.  flaxseed oil 1,000 mg cap Take  by mouth.  b complex vitamins tablet Take 1 Tab by mouth daily.     calcium citrate/vitamin D3 (CALCIUM CITRATE + D PO) Take  by mouth daily.  cholecalciferol (VITAMIN D3) 1,000 unit tablet Take  by mouth daily.  gabapentin (NEURONTIN) 300 mg capsule Take 300 mg by mouth three (3) times daily. Takes 6 tabs at night for restless legs     No current facility-administered medications for this visit. Allergies   Allergen Reactions    Other Medication Angioedema     Crest toothpaste    Seafood Anaphylaxis    Shellfish Derived Anaphylaxis    Soy Anaphylaxis    Soy Protein Anaphylaxis    Soybean Oil Anaphylaxis    Bacitracin Unknown (comments)     \"Prevents healing\"    Beef Containing Products Nausea and Vomiting    Beef Extract Diarrhea     Other reaction(s): Nausea and Vomiting    Milk Containing Products Nausea and Vomiting    Ropinirole Palpitations    Watermelon Nausea and Vomiting    Adhesive Tape-Silicones Rash     Byrd-Keven syndrome    Sulfa (Sulfonamide Antibiotics) Rash        Review of Systems: A complete review of systems was obtained, negative except as described above. Physical Exam:   There were no vitals taken for this visit. ECOG PS: 1  General: No distress  Eyes: PERRLA, anicteric sclerae  HENT: Atraumatic, OP clear  Neck: Supple  Lymphatic: No cervical, supraclavicular, or inguinal adenopathy  Respiratory: CTAB, normal respiratory effort  CV: Normal rate, regular rhythm, no murmurs, no peripheral edema  GI: Soft, nontender, nondistended, no masses, no hepatomegaly, no splenomegaly  MS: Normal gait and station. Digits without clubbing or cyanosis. Skin: No rashes, ecchymoses, or petechiae. Normal temperature, turgor, and texture. Psych: Alert, oriented, appropriate affect, normal judgment/insight    Results:     Lab Results   Component Value Date/Time    WBC 6.1 07/15/2020 11:10 AM    HGB 9.9 (L) 07/15/2020 11:10 AM    HCT 30.6 (L) 07/15/2020 11:10 AM    PLATELET 654 30/76/8097 11:10 AM    .0 (H) 07/15/2020 11:10 AM    ABS.  NEUTROPHILS 3.7 07/15/2020 11:10 AM     Lab Results Component Value Date/Time    Sodium 144 07/15/2020 11:10 AM    Potassium 4.9 07/15/2020 11:10 AM    Chloride 106 07/15/2020 11:10 AM    CO2 31 07/15/2020 11:10 AM    Glucose 83 07/15/2020 11:10 AM    BUN 39 (H) 07/15/2020 11:10 AM    Creatinine 1.19 (H) 07/15/2020 11:10 AM    GFR est AA 52 (L) 07/15/2020 11:10 AM    GFR est non-AA 43 (L) 07/15/2020 11:10 AM    Calcium 9.5 07/15/2020 11:10 AM     Lab Results   Component Value Date/Time    Bilirubin, total 0.2 10/17/2019 02:50 PM    ALT (SGPT) 26 10/17/2019 02:50 PM    Alk. phosphatase 82 10/17/2019 02:50 PM    Protein, total 6.9 10/17/2019 02:50 PM    Protein, total 6.4 10/17/2019 02:50 PM    Albumin 3.6 10/17/2019 02:50 PM    Globulin 3.3 10/17/2019 02:50 PM       SPEP/PAUL/FLC--no monoclonal process  Iron, B12, Folate all normal  Retic Count inappropriately normal    AIHA ruled out in July 2020--nml haptoglobin and negative TRUDI     Colonoscopy 2013--clear--will not have another     Bone Marrow Biopsy 11/26/2019  FINAL PATHOLOGIC DIAGNOSIS   Bone marrow, posterior iliac crest, aspirate, clot section, and decalcified core biopsy:   Normocellular marrow maturing trilineage hematopoiesis   Flow cytometry shows no diagnostic immunophenotypic abnormalities   Cytogenetics Normal Female 46XX     Assessment:   1) Anemia  2) Erythromelaglia        Plan:   1) Ruled out MGUS/MDS/Myeloma/AIHA--could be anemia from CKD.  No blood losses that she's aware.  Will bring back in 3 months  2) Can be associated with MPD--no evidence in peripheral blood or bone marrow.        Signed By: Mina Shannon MD

## 2020-11-24 NOTE — PROGRESS NOTES
Cintia Alicea is a 85 y. o. female who is seen for eval of Erythromelaglia and anemia. Patient states she is feeling better since she started on the lower dose of Lyrica, and started Prazosin.

## 2020-11-25 ENCOUNTER — OFFICE VISIT (OUTPATIENT)
Dept: ONCOLOGY | Age: 85
End: 2020-11-25
Payer: MEDICARE

## 2020-11-25 VITALS
OXYGEN SATURATION: 99 % | BODY MASS INDEX: 26.28 KG/M2 | TEMPERATURE: 97.8 F | DIASTOLIC BLOOD PRESSURE: 62 MMHG | HEIGHT: 61 IN | SYSTOLIC BLOOD PRESSURE: 138 MMHG | RESPIRATION RATE: 16 BRPM | HEART RATE: 60 BPM | WEIGHT: 139.2 LBS

## 2020-11-25 DIAGNOSIS — D64.9 ANEMIA, UNSPECIFIED TYPE: Primary | ICD-10-CM

## 2020-11-25 PROCEDURE — 99213 OFFICE O/P EST LOW 20 MIN: CPT | Performed by: INTERNAL MEDICINE

## 2020-11-25 RX ORDER — PRAZOSIN HYDROCHLORIDE 1 MG/1
1 CAPSULE ORAL
COMMUNITY
Start: 2020-10-08

## 2020-11-25 NOTE — PATIENT INSTRUCTIONS

## 2020-11-25 NOTE — PROGRESS NOTES
Reason for Visit:   [address] 86 P. o. female who is seen for eval of Erythromelaglia     Treatment History:   Dx: Anemia, Erythromelaglia    History of Present Illness:   Doing very well. Had dose of lyrica dropped and symptoms improved. Also begun on prazosin which has helped tremor and sleep. No recent illnesses/infections. Past Medical History:   Diagnosis Date    Asthma     Menopause       Past Surgical History:   Procedure Laterality Date    HX APPENDECTOMY      HX BREAST BIOPSY Left 1963    Benign    HX HEENT      tonsil out  at age 25      Social History     Tobacco Use    Smoking status: Former Smoker     Packs/day: 0.50     Years: 25.00     Pack years: 12.50     Types: Cigarettes    Smokeless tobacco: Never Used   Substance Use Topics    Alcohol use: Not Currently      Family History   Problem Relation Age of Onset    Dementia Mother     Osteoporosis Mother     Diabetes Father     Stroke Father     Arthritis-osteo Maternal Aunt     Stroke Paternal Grandfather      Current Outpatient Medications   Medication Sig    prazosin (MINIPRESS) 1 mg capsule Take 1 Cap by mouth nightly.  acetaminophen (Tylenol Arthritis Pain) 650 mg TbER Take 650 mg by mouth two (2) times a day.  vit A/vit C/vit E/zinc/copper (PRESERVISION AREDS PO) Take 2 Tabs by mouth daily.  NEUPRO 3 mg/24 hour patch Apply 1 Patch to affected area every twenty-four (24) hours.  levothyroxine (SYNTHROID) 50 mcg tablet Take  by mouth Daily (before breakfast).  teriparatide (FORTEO) 20 mcg/dose - 600 mcg/2.4 mL pnij injection 20 mcg by SubCUTAneous route daily.  multivitamin (ONE A DAY) tablet Take 2 Tabs by mouth daily.  b complex vitamins tablet Take 1 Tab by mouth daily.  calcium citrate/vitamin D3 (CALCIUM CITRATE + D PO) Take  by mouth daily.  cholecalciferol (VITAMIN D3) 1,000 unit tablet Take  by mouth daily.  LYRICA 50 mg capsule Take 25 mg by mouth daily.     azelastine (ASTELIN) 137 mcg (0.1 %) nasal spray 1 Spray two (2) times a day. Use in each nostril as directed    colestipol (COLESTID) 1 gram tablet Take 1 g by mouth three (3) times daily.  flaxseed oil 1,000 mg cap Take  by mouth.  gabapentin (NEURONTIN) 300 mg capsule Take 300 mg by mouth three (3) times daily. Takes 6 tabs at night for restless legs     No current facility-administered medications for this visit. Allergies   Allergen Reactions    Genistein Anaphylaxis    Other Medication Angioedema     Crest toothpaste    Seafood Anaphylaxis    Shellfish Derived Anaphylaxis    Soy Anaphylaxis    Soy Protein Anaphylaxis    Soybean Oil Anaphylaxis    Bacitracin Unknown (comments)     \"Prevents healing\"    Beef Containing Products Nausea and Vomiting    Beef Extract Diarrhea     Other reaction(s): Nausea and Vomiting    Milk Containing Products Nausea and Vomiting    Ropinirole Palpitations    Watermelon Nausea and Vomiting    Adhesive Tape-Silicones Rash     Byrd-Keven syndrome    Sulfa (Sulfonamide Antibiotics) Rash        Review of Systems: A complete review of systems was obtained, negative except as described above. Physical Exam:     Visit Vitals  /62   Pulse 60   Temp 97.8 °F (36.6 °C) (Oral)   Resp 16   Ht 5' 1\" (1.549 m)   Wt 139 lb 3.2 oz (63.1 kg)   SpO2 99%   BMI 26.30 kg/m²     ECOG PS: 1  General: No distress  Eyes: PERRLA, anicteric sclerae  HENT: Atraumatic, OP clear  Neck: Supple  Lymphatic: No cervical, supraclavicular, or inguinal adenopathy  Respiratory: CTAB, normal respiratory effort  CV: Normal rate, regular rhythm, no murmurs, no peripheral edema  GI: Soft, nontender, nondistended, no masses, no hepatomegaly, no splenomegaly  MS: Normal gait and station. Digits without clubbing or cyanosis. Skin: No rashes, ecchymoses, or petechiae. Normal temperature, turgor, and texture.   Psych: Alert, oriented, appropriate affect, normal judgment/insight    Results:     Lab Results   Component Value Date/Time    WBC 7.3 11/19/2020 08:30 AM    HGB 10.3 (L) 11/19/2020 08:30 AM    HCT 31.5 (L) 11/19/2020 08:30 AM    PLATELET 042 89/19/4683 08:30 AM    .0 (H) 11/19/2020 08:30 AM    ABS. NEUTROPHILS 4.1 11/19/2020 08:30 AM     Lab Results   Component Value Date/Time    Sodium 139 11/19/2020 08:30 AM    Potassium 4.5 11/19/2020 08:30 AM    Chloride 102 11/19/2020 08:30 AM    CO2 27 11/19/2020 08:30 AM    Glucose 95 11/19/2020 08:30 AM    BUN 41 (H) 11/19/2020 08:30 AM    Creatinine 1.66 (H) 11/19/2020 08:30 AM    GFR est AA 36 (L) 11/19/2020 08:30 AM    GFR est non-AA 29 (L) 11/19/2020 08:30 AM    Calcium 9.8 11/19/2020 08:30 AM     Lab Results   Component Value Date/Time    Bilirubin, total 0.3 11/19/2020 08:30 AM    ALT (SGPT) 25 11/19/2020 08:30 AM    Alk. phosphatase 78 11/19/2020 08:30 AM    Protein, total 6.9 11/19/2020 08:30 AM    Albumin 3.9 11/19/2020 08:30 AM    Globulin 3.0 11/19/2020 08:30 AM       SPEP/PAUL/FLC--no monoclonal process  Iron, B12, Folate all normal  Retic Count inappropriately normal     AIHA ruled out in July 2020--nml haptoglobin and negative TRUDI     Colonoscopy 2013--clear--will not have another     Bone Marrow Biopsy 11/26/2019  FINAL PATHOLOGIC DIAGNOSIS   Bone marrow, posterior iliac crest, aspirate, clot section, and decalcified core biopsy:   Normocellular marrow maturing trilineage hematopoiesis   Flow cytometry shows no diagnostic immunophenotypic abnormalities   Cytogenetics Normal Female 46XX     Assessment:   1) Anemia  2) Erythromelaglia        Plan:   1) Ruled out MGUS/MDS/Myeloma/AIHA--could be anemia from CKD.  No blood losses that she's aware.  Will bring back in 6 months.   2) Can be associated with MPD--no evidence in peripheral blood or bone marrow.          Signed By: Eboni Browning MD

## 2021-06-25 ENCOUNTER — APPOINTMENT (OUTPATIENT)
Dept: INFUSION THERAPY | Age: 86
End: 2021-06-25

## 2021-08-03 NOTE — PROGRESS NOTES
Juliocesar Alicea is a 80 y. o. female who is seen for eval of Erythromelaglia and anemia. Patient is not sleeping due to restless legs. She only got about 3 hours sleep last night. Denies  Additional complaints. Patient hypertensive,  She will monitor  BP at home, and follow up with PCP if it remains 140/80 or greater. DR Barragan Pitch aware.

## 2021-08-04 ENCOUNTER — OFFICE VISIT (OUTPATIENT)
Dept: ONCOLOGY | Age: 86
End: 2021-08-04
Payer: MEDICARE

## 2021-08-04 ENCOUNTER — HOSPITAL ENCOUNTER (OUTPATIENT)
Dept: INFUSION THERAPY | Age: 86
Discharge: HOME | End: 2021-08-04
Payer: MEDICARE

## 2021-08-04 VITALS
TEMPERATURE: 98.4 F | HEIGHT: 61 IN | BODY MASS INDEX: 27.6 KG/M2 | WEIGHT: 146.2 LBS | SYSTOLIC BLOOD PRESSURE: 152 MMHG | RESPIRATION RATE: 16 BRPM | DIASTOLIC BLOOD PRESSURE: 70 MMHG | OXYGEN SATURATION: 99 % | HEART RATE: 71 BPM

## 2021-08-04 DIAGNOSIS — D64.9 ANEMIA, UNSPECIFIED TYPE: ICD-10-CM

## 2021-08-04 DIAGNOSIS — D64.9 ANEMIA, UNSPECIFIED TYPE: Primary | ICD-10-CM

## 2021-08-04 LAB
ALBUMIN SERPL-MCNC: 3.4 G/DL (ref 3.5–5)
ALBUMIN/GLOB SERPL: 1 {RATIO} (ref 1.1–2.2)
ALP SERPL-CCNC: 51 U/L (ref 45–117)
ALT SERPL-CCNC: 28 U/L (ref 12–78)
ANION GAP SERPL CALC-SCNC: 8 MMOL/L (ref 5–15)
AST SERPL-CCNC: 33 U/L (ref 15–37)
BASOPHILS # BLD: 0.1 K/UL (ref 0–0.1)
BASOPHILS NFR BLD: 1 % (ref 0–1)
BILIRUB DIRECT SERPL-MCNC: 0.1 MG/DL (ref 0–0.2)
BILIRUB SERPL-MCNC: 0.2 MG/DL (ref 0.2–1)
BUN SERPL-MCNC: 47 MG/DL (ref 6–20)
BUN/CREAT SERPL: 32 (ref 12–20)
CALCIUM SERPL-MCNC: 8.6 MG/DL (ref 8.5–10.1)
CHLORIDE SERPL-SCNC: 105 MMOL/L (ref 97–108)
CO2 SERPL-SCNC: 28 MMOL/L (ref 21–32)
CREAT SERPL-MCNC: 1.46 MG/DL (ref 0.55–1.02)
DIFFERENTIAL METHOD BLD: ABNORMAL
EOSINOPHIL # BLD: 0.1 K/UL (ref 0–0.4)
EOSINOPHIL NFR BLD: 1 % (ref 0–7)
ERYTHROCYTE [DISTWIDTH] IN BLOOD BY AUTOMATED COUNT: 13.2 % (ref 11.5–14.5)
GLOBULIN SER CALC-MCNC: 3.5 G/DL (ref 2–4)
GLUCOSE SERPL-MCNC: 94 MG/DL (ref 65–100)
HCT VFR BLD AUTO: 29.8 % (ref 35–47)
HGB BLD-MCNC: 9.7 G/DL (ref 11.5–16)
IMM GRANULOCYTES # BLD AUTO: 0 K/UL (ref 0–0.04)
IMM GRANULOCYTES NFR BLD AUTO: 0 % (ref 0–0.5)
LYMPHOCYTES # BLD: 2.1 K/UL (ref 0.8–3.5)
LYMPHOCYTES NFR BLD: 18 % (ref 12–49)
MCH RBC QN AUTO: 32.1 PG (ref 26–34)
MCHC RBC AUTO-ENTMCNC: 32.6 G/DL (ref 30–36.5)
MCV RBC AUTO: 98.7 FL (ref 80–99)
MONOCYTES # BLD: 0.8 K/UL (ref 0–1)
MONOCYTES NFR BLD: 6 % (ref 5–13)
NEUTS SEG # BLD: 9 K/UL (ref 1.8–8)
NEUTS SEG NFR BLD: 74 % (ref 32–75)
NRBC # BLD: 0 K/UL (ref 0–0.01)
NRBC BLD-RTO: 0 PER 100 WBC
PLATELET # BLD AUTO: 199 K/UL (ref 150–400)
PMV BLD AUTO: 10.4 FL (ref 8.9–12.9)
POTASSIUM SERPL-SCNC: 5.1 MMOL/L (ref 3.5–5.1)
PROT SERPL-MCNC: 6.9 G/DL (ref 6.4–8.2)
RBC # BLD AUTO: 3.02 M/UL (ref 3.8–5.2)
SODIUM SERPL-SCNC: 141 MMOL/L (ref 136–145)
WBC # BLD AUTO: 12.1 K/UL (ref 3.6–11)

## 2021-08-04 PROCEDURE — 80048 BASIC METABOLIC PNL TOTAL CA: CPT

## 2021-08-04 PROCEDURE — 1100F PTFALLS ASSESS-DOCD GE2>/YR: CPT | Performed by: INTERNAL MEDICINE

## 2021-08-04 PROCEDURE — G8536 NO DOC ELDER MAL SCRN: HCPCS | Performed by: INTERNAL MEDICINE

## 2021-08-04 PROCEDURE — G8510 SCR DEP NEG, NO PLAN REQD: HCPCS | Performed by: INTERNAL MEDICINE

## 2021-08-04 PROCEDURE — G8419 CALC BMI OUT NRM PARAM NOF/U: HCPCS | Performed by: INTERNAL MEDICINE

## 2021-08-04 PROCEDURE — 1090F PRES/ABSN URINE INCON ASSESS: CPT | Performed by: INTERNAL MEDICINE

## 2021-08-04 PROCEDURE — 36415 COLL VENOUS BLD VENIPUNCTURE: CPT

## 2021-08-04 PROCEDURE — 85025 COMPLETE CBC W/AUTO DIFF WBC: CPT

## 2021-08-04 PROCEDURE — 3288F FALL RISK ASSESSMENT DOCD: CPT | Performed by: INTERNAL MEDICINE

## 2021-08-04 PROCEDURE — G8427 DOCREV CUR MEDS BY ELIG CLIN: HCPCS | Performed by: INTERNAL MEDICINE

## 2021-08-04 PROCEDURE — 99213 OFFICE O/P EST LOW 20 MIN: CPT | Performed by: INTERNAL MEDICINE

## 2021-08-04 PROCEDURE — 80076 HEPATIC FUNCTION PANEL: CPT

## 2021-08-04 RX ORDER — FAMOTIDINE 20 MG/1
1 TABLET, FILM COATED ORAL
COMMUNITY
Start: 2021-05-04

## 2021-08-04 RX ORDER — CLOBETASOL PROPIONATE 0.5 MG/G
1 OINTMENT TOPICAL 2 TIMES DAILY
COMMUNITY
Start: 2021-06-23

## 2021-08-04 RX ORDER — BISACODYL 5 MG
1 TABLET, DELAYED RELEASE (ENTERIC COATED) ORAL
COMMUNITY
Start: 2021-07-15

## 2021-08-04 RX ORDER — DENOSUMAB 60 MG/ML
60 INJECTION SUBCUTANEOUS
COMMUNITY

## 2021-08-04 NOTE — PROGRESS NOTES
Reason for Visit:   Mikey Guzman a 76 G. o. female who is seen for eval of Erythromelaglia     Treatment History:   Dx: Anemia, Erythromelaglia    History of Present Illness:   Problems with restless legs and insomnia. Working with Dr Marisela Nance. No bleeding or bruising, no new medications. Overall well. Past Medical History:   Diagnosis Date    Asthma     Menopause       Past Surgical History:   Procedure Laterality Date    HX APPENDECTOMY      HX BREAST BIOPSY Left 1963    Benign    HX HEENT      tonsil out  at age 25      Social History     Tobacco Use    Smoking status: Former Smoker     Packs/day: 0.50     Years: 25.00     Pack years: 12.50     Types: Cigarettes    Smokeless tobacco: Never Used   Substance Use Topics    Alcohol use: Not Currently      Family History   Problem Relation Age of Onset    Dementia Mother     Osteoporosis Mother     Diabetes Father     Stroke Father     Arthritis-osteo Maternal Aunt     Stroke Paternal Grandfather      Current Outpatient Medications   Medication Sig    prazosin (MINIPRESS) 1 mg capsule Take 1 Cap by mouth nightly.  acetaminophen (Tylenol Arthritis Pain) 650 mg TbER Take 650 mg by mouth two (2) times a day.  vit A/vit C/vit E/zinc/copper (PRESERVISION AREDS PO) Take 2 Tabs by mouth daily.  LYRICA 50 mg capsule Take 25 mg by mouth daily.  NEUPRO 3 mg/24 hour patch Apply 1 Patch to affected area every twenty-four (24) hours.  levothyroxine (SYNTHROID) 50 mcg tablet Take  by mouth Daily (before breakfast).  azelastine (ASTELIN) 137 mcg (0.1 %) nasal spray 1 Spray two (2) times a day. Use in each nostril as directed    colestipol (COLESTID) 1 gram tablet Take 1 g by mouth three (3) times daily.  teriparatide (FORTEO) 20 mcg/dose - 600 mcg/2.4 mL pnij injection 20 mcg by SubCUTAneous route daily.  multivitamin (ONE A DAY) tablet Take 2 Tabs by mouth daily.  flaxseed oil 1,000 mg cap Take  by mouth.     b complex vitamins tablet Take 1 Tab by mouth daily.  calcium citrate/vitamin D3 (CALCIUM CITRATE + D PO) Take  by mouth daily.  cholecalciferol (VITAMIN D3) 1,000 unit tablet Take  by mouth daily.  gabapentin (NEURONTIN) 300 mg capsule Take 300 mg by mouth three (3) times daily. Takes 6 tabs at night for restless legs     No current facility-administered medications for this visit. Allergies   Allergen Reactions    Genistein Anaphylaxis    Other Medication Angioedema     Crest toothpaste    Seafood Anaphylaxis    Shellfish Derived Anaphylaxis    Soy Anaphylaxis    Soy Protein Anaphylaxis    Soybean Oil Anaphylaxis    Bacitracin Unknown (comments)     \"Prevents healing\"    Beef Containing Products Nausea and Vomiting    Beef Extract Diarrhea     Other reaction(s): Nausea and Vomiting    Milk Containing Products Nausea and Vomiting    Ropinirole Palpitations    Watermelon Nausea and Vomiting    Adhesive Tape-Silicones Rash     Byrd-Keven syndrome    Sulfa (Sulfonamide Antibiotics) Rash        Review of Systems: A complete review of systems was obtained, negative except as described above. Physical Exam:   There were no vitals taken for this visit. ECOG PS: 1  General: No distress  Eyes: PERRLA, anicteric sclerae  HENT: Atraumatic, OP clear  Neck: Supple  Lymphatic: No cervical, supraclavicular, or inguinal adenopathy  Respiratory: CTAB, normal respiratory effort  CV: Normal rate, regular rhythm, no murmurs, no peripheral edema  GI: Soft, nontender, nondistended, no masses, no hepatomegaly, no splenomegaly  MS: Normal gait and station. Digits without clubbing or cyanosis. Skin: No rashes, ecchymoses, or petechiae. Normal temperature, turgor, and texture.   Psych: Alert, oriented, appropriate affect, normal judgment/insight    Results:     Lab Results   Component Value Date/Time    WBC 7.3 11/19/2020 08:30 AM    HGB 10.3 (L) 11/19/2020 08:30 AM    HCT 31.5 (L) 11/19/2020 08:30 AM PLATELET 122 54/67/1991 08:30 AM    .0 (H) 11/19/2020 08:30 AM    ABS. NEUTROPHILS 4.1 11/19/2020 08:30 AM     Lab Results   Component Value Date/Time    Sodium 139 11/19/2020 08:30 AM    Potassium 4.5 11/19/2020 08:30 AM    Chloride 102 11/19/2020 08:30 AM    CO2 27 11/19/2020 08:30 AM    Glucose 95 11/19/2020 08:30 AM    BUN 41 (H) 11/19/2020 08:30 AM    Creatinine 1.66 (H) 11/19/2020 08:30 AM    GFR est AA 36 (L) 11/19/2020 08:30 AM    GFR est non-AA 29 (L) 11/19/2020 08:30 AM    Calcium 9.8 11/19/2020 08:30 AM     Lab Results   Component Value Date/Time    Bilirubin, total 0.3 11/19/2020 08:30 AM    ALT (SGPT) 25 11/19/2020 08:30 AM    Alk. phosphatase 78 11/19/2020 08:30 AM    Protein, total 6.9 11/19/2020 08:30 AM    Albumin 3.9 11/19/2020 08:30 AM    Globulin 3.0 11/19/2020 08:30 AM       SPEP/PAUL/FLC--no monoclonal process  Iron, B12, Folate all normal  Retic Count inappropriately normal     AIHA ruled out in July 2020--nml haptoglobin and negative TRUDI     Colonoscopy 2013--clear--will not have another     Bone Marrow Biopsy 11/26/2019  FINAL PATHOLOGIC DIAGNOSIS   Bone marrow, posterior iliac crest, aspirate, clot section, and decalcified core biopsy:   Normocellular marrow maturing trilineage hematopoiesis   Flow cytometry shows no diagnostic immunophenotypic abnormalities   Cytogenetics Normal Female 46XX     Assessment:   1) Anemia  2) Erythromelaglia     Plan:   1) Ruled out MGUS/MDS/Myeloma/AIHA--could be anemia from CKD.  No blood losses that she's aware.  Will bring back in 6 months. 2) Can be associated with MPD--no evidence in peripheral blood or bone marrow.      Signed By: Kasandra Arias MD

## 2021-08-04 NOTE — PATIENT INSTRUCTIONS
Heart-Healthy Diet: Care Instructions  Your Care Instructions     A heart-healthy diet has lots of vegetables, fruits, nuts, beans, and whole grains, and is low in salt. It limits foods that are high in saturated fat, such as meats, cheeses, and fried foods. It may be hard to change your diet, but even small changes can lower your risk of heart attack and heart disease. Follow-up care is a key part of your treatment and safety. Be sure to make and go to all appointments, and call your doctor if you are having problems. It's also a good idea to know your test results and keep a list of the medicines you take. How can you care for yourself at home? Watch your portions  · Use food labels to learn what the recommended servings are for the foods you eat. · Eat only the number of calories you need to stay at a healthy weight. If you need to lose weight, eat fewer calories than your body burns (through exercise and other physical activity). Eat more fruits and vegetables  · Eat a variety of fruit and vegetables every day. Dark green, deep orange, red, or yellow fruits and vegetables are especially good for you. Examples include spinach, carrots, peaches, and berries. · Keep carrots, celery, and other veggies handy for snacks. Buy fruit that is in season and store it where you can see it so that you will be tempted to eat it. · Cook dishes that have a lot of veggies in them, such as stir-fries and soups. Limit saturated fat  · Read food labels, and try to avoid saturated fats. They increase your risk of heart disease. · Use olive or canola oil when you cook. · Bake, broil, grill, or steam foods instead of frying them. · Choose lean meats instead of high-fat meats such as hot dogs and sausages. Cut off all visible fat when you prepare meat. · Eat fish, skinless poultry, and meat alternatives such as soy products instead of high-fat meats.  Soy products, such as tofu, may be especially good for your heart.  · Choose low-fat or fat-free milk and dairy products. Eat foods high in fiber  · Eat a variety of grain products every day. Include whole-grain foods that have lots of fiber and nutrients. Examples of whole-grain foods include oats, whole wheat bread, and brown rice. · Buy whole-grain breads and cereals, instead of white bread or pastries. Limit salt and sodium  · Limit how much salt and sodium you eat to help lower your blood pressure. · Taste food before you salt it. Add only a little salt when you think you need it. With time, your taste buds will adjust to less salt. · Eat fewer snack items, fast foods, and other high-salt, processed foods. Check food labels for the amount of sodium in packaged foods. · Choose low-sodium versions of canned goods (such as soups, vegetables, and beans). Limit sugar  · Limit drinks and foods with added sugar. These include candy, desserts, and soda pop. Limit alcohol  · Limit alcohol to no more than 2 drinks a day for men and 1 drink a day for women. Too much alcohol can cause health problems. When should you call for help? Watch closely for changes in your health, and be sure to contact your doctor if:    · You would like help planning heart-healthy meals. Where can you learn more? Go to http://www.shepard.com/  Enter V137 in the search box to learn more about \"Heart-Healthy Diet: Care Instructions. \"  Current as of: December 17, 2020               Content Version: 12.8  © 2006-2021 Healthwise, iCopyright. Care instructions adapted under license by [employer] (which disclaims liability or warranty for this information). If you have questions about a medical condition or this instruction, always ask your healthcare professional. [place] [address] any warranty or liability for your use of this information.          High Blood Pressure: Care Instructions  Overview     It's normal for blood pressure to go up and down throughout the day. But if it stays up, you have high blood pressure. Another name for high blood pressure is hypertension. Despite what a lot of people think, high blood pressure usually doesn't cause headaches or make you feel dizzy or lightheaded. It usually has no symptoms. But it does increase your risk of stroke, heart attack, and other problems. You and your doctor will talk about your risks of these problems based on your blood pressure. Your doctor will give you a goal for your blood pressure. Your goal will be based on your health and your age. Lifestyle changes, such as eating healthy and being active, are always important to help lower blood pressure. You might also take medicine to reach your blood pressure goal.  Follow-up care is a key part of your treatment and safety. Be sure to make and go to all appointments, and call your doctor if you are having problems. It's also a good idea to know your test results and keep a list of the medicines you take. How can you care for yourself at home? Medical treatment  · If you stop taking your medicine, your blood pressure will go back up. You may take one or more types of medicine to lower your blood pressure. Be safe with medicines. Take your medicine exactly as prescribed. Call your doctor if you think you are having a problem with your medicine. · Talk to your doctor before you start taking aspirin every day. Aspirin can help certain people lower their risk of a heart attack or stroke. But taking aspirin isn't right for everyone, because it can cause serious bleeding. · See your doctor regularly. You may need to see the doctor more often at first or until your blood pressure comes down. · If you are taking blood pressure medicine, talk to your doctor before you take decongestants or anti-inflammatory medicine, such as ibuprofen. Some of these medicines can raise blood pressure. · Learn how to check your blood pressure at home.   Lifestyle changes  · Stay at a healthy weight. This is especially important if you put on weight around the waist. Losing even 10 pounds can help you lower your blood pressure. · If your doctor recommends it, get more exercise. Walking is a good choice. Bit by bit, increase the amount you walk every day. Try for at least 30 minutes on most days of the week. You also may want to swim, bike, or do other activities. · Avoid or limit alcohol. Talk to your doctor about whether you can drink any alcohol. · Try to limit how much sodium you eat to less than 2,300 milligrams (mg) a day. Your doctor may ask you to try to eat less than 1,500 mg a day. · Eat plenty of fruits (such as bananas and oranges), vegetables, legumes, whole grains, and low-fat dairy products. · Lower the amount of saturated fat in your diet. Saturated fat is found in animal products such as milk, cheese, and meat. Limiting these foods may help you lose weight and also lower your risk for heart disease. · Do not smoke. Smoking increases your risk for heart attack and stroke. If you need help quitting, talk to your doctor about stop-smoking programs and medicines. These can increase your chances of quitting for good. When should you call for help? Call  911 anytime you think you may need emergency care. This may mean having symptoms that suggest that your blood pressure is causing a serious heart or blood vessel problem. Your blood pressure may be over 180/120. For example, call 911 if:    · You have symptoms of a heart attack. These may include:  ? Chest pain or pressure, or a strange feeling in the chest.  ? Sweating. ? Shortness of breath. ? Nausea or vomiting. ? Pain, pressure, or a strange feeling in the back, neck, jaw, or upper belly or in one or both shoulders or arms. ? Lightheadedness or sudden weakness. ? A fast or irregular heartbeat.     · You have symptoms of a stroke.  These may include:  ? Sudden numbness, tingling, weakness, or loss of movement in your face, arm, or leg, especially on only one side of your body. ? Sudden vision changes. ? Sudden trouble speaking. ? Sudden confusion or trouble understanding simple statements. ? Sudden problems with walking or balance. ? A sudden, severe headache that is different from past headaches.     · You have severe back or belly pain. Do not wait until your blood pressure comes down on its own. Get help right away. Call your doctor now or seek immediate care if:    · Your blood pressure is much higher than normal (such as 180/120 or higher), but you don't have symptoms.     · You think high blood pressure is causing symptoms, such as:  ? Severe headache.  ? Blurry vision. Watch closely for changes in your health, and be sure to contact your doctor if:    · Your blood pressure measures higher than your doctor recommends at least 2 times. That means the top number is higher or the bottom number is higher, or both.     · You think you may be having side effects from your blood pressure medicine. Where can you learn more? Go to http://www.gray.com/  Enter [id number] in the search box to learn more about \"High Blood Pressure: Care Instructions. \"  Current as of: August 31, 2020               Content Version: 12.8  © 2006-2021 [employer]. Care instructions adapted under license by [employer] (which disclaims liability or warranty for this information). If you have questions about a medical condition or this instruction, always ask your healthcare professional. [place] [address] any warranty or liability for your use of this information.

## 2021-08-04 NOTE — LETTER
8/4/2021    Patient: Rhonda Chaves   YOB: 1935   Date of Visit: 8/4/2021     Ruchi Escobedo MD  . [facility] [address]  [place] [address]  Via Fax: [phone]    Dear Ruchi Escobedo MD,      Thank you for referring Ms. Rudi Patel to [facility] for evaluation. My notes for this consultation are attached. If you have questions, please do not hesitate to call me. I look forward to following your patient along with you.       Sincerely,    Ad Salvador MD

## 2021-08-05 ENCOUNTER — HOSPITAL ENCOUNTER (OUTPATIENT)
Dept: MAMMOGRAPHY | Age: 86
Discharge: HOME | End: 2021-08-05
Attending: INTERNAL MEDICINE
Payer: MEDICARE

## 2021-08-05 DIAGNOSIS — Z12.31 VISIT FOR SCREENING MAMMOGRAM: ICD-10-CM

## 2021-08-05 PROCEDURE — 77063 BREAST TOMOSYNTHESIS BI: CPT

## 2021-08-05 NOTE — PROGRESS NOTES
Blood counts looked about the same. WBC was a little elevated but this is non specific.   Please inform

## 2021-08-11 ENCOUNTER — TELEPHONE (OUTPATIENT)
Dept: ONCOLOGY | Age: 86
End: 2021-08-11

## 2021-08-11 NOTE — TELEPHONE ENCOUNTER
----- Message from Magaly Headley MD sent at 8/5/2021 10:07 AM EDT -----  Blood counts looked about the same. WBC was a little elevated but this is non specific.   Please inform

## 2021-10-19 ENCOUNTER — TRANSCRIBE ORDER (OUTPATIENT)
Dept: SCHEDULING | Age: 86
End: 2021-10-19

## 2021-10-19 DIAGNOSIS — M81.0 AGE-RELATED OSTEOPOROSIS WITHOUT CURRENT PATHOLOGICAL FRACTURE: Primary | ICD-10-CM

## 2021-10-26 ENCOUNTER — HOSPITAL ENCOUNTER (OUTPATIENT)
Dept: GENERAL RADIOLOGY | Age: 86
Discharge: HOME | End: 2021-10-26
Attending: INTERNAL MEDICINE
Payer: MEDICARE

## 2021-10-26 DIAGNOSIS — M81.0 AGE-RELATED OSTEOPOROSIS WITHOUT CURRENT PATHOLOGICAL FRACTURE: ICD-10-CM

## 2021-10-26 PROCEDURE — 77080 DXA BONE DENSITY AXIAL: CPT

## 2022-03-30 ENCOUNTER — TELEPHONE (OUTPATIENT)
Dept: ONCOLOGY | Age: 87
End: 2022-03-30

## 2022-05-08 PROBLEM — D50.9 IRON DEFICIENCY ANEMIA: Status: ACTIVE | Noted: 2022-05-08

## 2022-05-08 RX ORDER — SODIUM CHLORIDE 9 MG/ML
10 INJECTION INTRAMUSCULAR; INTRAVENOUS; SUBCUTANEOUS AS NEEDED
Status: CANCELLED | OUTPATIENT
Start: 2022-05-11

## 2022-05-08 RX ORDER — DIPHENHYDRAMINE HYDROCHLORIDE 50 MG/ML
50 INJECTION, SOLUTION INTRAMUSCULAR; INTRAVENOUS AS NEEDED
Status: CANCELLED
Start: 2022-05-11

## 2022-05-08 RX ORDER — HYDROCORTISONE SODIUM SUCCINATE 100 MG/2ML
100 INJECTION, POWDER, FOR SOLUTION INTRAMUSCULAR; INTRAVENOUS AS NEEDED
Status: CANCELLED | OUTPATIENT
Start: 2022-05-11

## 2022-05-08 RX ORDER — ALBUTEROL SULFATE 0.83 MG/ML
2.5 SOLUTION RESPIRATORY (INHALATION) AS NEEDED
Status: CANCELLED
Start: 2022-05-11

## 2022-05-08 RX ORDER — HEPARIN 100 UNIT/ML
300-500 SYRINGE INTRAVENOUS AS NEEDED
Status: CANCELLED
Start: 2022-05-11

## 2022-05-08 RX ORDER — EPINEPHRINE 1 MG/ML
0.3 INJECTION, SOLUTION, CONCENTRATE INTRAVENOUS AS NEEDED
Status: CANCELLED | OUTPATIENT
Start: 2022-05-11

## 2022-05-11 ENCOUNTER — HOSPITAL ENCOUNTER (OUTPATIENT)
Dept: INFUSION THERAPY | Age: 87
Discharge: HOME | End: 2022-05-11
Payer: MEDICARE

## 2022-05-11 VITALS
HEART RATE: 72 BPM | TEMPERATURE: 96.6 F | WEIGHT: 139 LBS | DIASTOLIC BLOOD PRESSURE: 78 MMHG | OXYGEN SATURATION: 99 % | RESPIRATION RATE: 17 BRPM | SYSTOLIC BLOOD PRESSURE: 177 MMHG | BODY MASS INDEX: 26.26 KG/M2

## 2022-05-11 DIAGNOSIS — D50.9 IRON DEFICIENCY ANEMIA, UNSPECIFIED IRON DEFICIENCY ANEMIA TYPE: Primary | ICD-10-CM

## 2022-05-11 PROCEDURE — 74011250636 HC RX REV CODE- 250/636: Performed by: INTERNAL MEDICINE

## 2022-05-11 PROCEDURE — 96374 THER/PROPH/DIAG INJ IV PUSH: CPT

## 2022-05-11 RX ORDER — SODIUM CHLORIDE 0.9 % (FLUSH) 0.9 %
10 SYRINGE (ML) INJECTION AS NEEDED
Status: CANCELLED | OUTPATIENT
Start: 2022-05-18

## 2022-05-11 RX ORDER — DIPHENHYDRAMINE HYDROCHLORIDE 50 MG/ML
50 INJECTION, SOLUTION INTRAMUSCULAR; INTRAVENOUS AS NEEDED
Status: CANCELLED
Start: 2022-05-18

## 2022-05-11 RX ORDER — SODIUM CHLORIDE 9 MG/ML
10 INJECTION INTRAMUSCULAR; INTRAVENOUS; SUBCUTANEOUS AS NEEDED
Status: CANCELLED | OUTPATIENT
Start: 2022-05-18

## 2022-05-11 RX ORDER — DIPHENHYDRAMINE HYDROCHLORIDE 50 MG/ML
25 INJECTION, SOLUTION INTRAMUSCULAR; INTRAVENOUS AS NEEDED
Status: ACTIVE | OUTPATIENT
Start: 2022-05-11 | End: 2022-05-11

## 2022-05-11 RX ORDER — EPINEPHRINE 1 MG/ML
0.3 INJECTION, SOLUTION, CONCENTRATE INTRAVENOUS AS NEEDED
Status: CANCELLED | OUTPATIENT
Start: 2022-05-18

## 2022-05-11 RX ORDER — ONDANSETRON 2 MG/ML
8 INJECTION INTRAMUSCULAR; INTRAVENOUS AS NEEDED
Status: ACTIVE | OUTPATIENT
Start: 2022-05-11 | End: 2022-05-11

## 2022-05-11 RX ORDER — DIPHENHYDRAMINE HYDROCHLORIDE 50 MG/ML
25 INJECTION, SOLUTION INTRAMUSCULAR; INTRAVENOUS AS NEEDED
Status: CANCELLED
Start: 2022-05-18

## 2022-05-11 RX ORDER — ACETAMINOPHEN 325 MG/1
650 TABLET ORAL AS NEEDED
Status: ACTIVE | OUTPATIENT
Start: 2022-05-11 | End: 2022-05-11

## 2022-05-11 RX ORDER — ONDANSETRON 2 MG/ML
8 INJECTION INTRAMUSCULAR; INTRAVENOUS AS NEEDED
Status: CANCELLED | OUTPATIENT
Start: 2022-05-18

## 2022-05-11 RX ORDER — ALBUTEROL SULFATE 0.83 MG/ML
2.5 SOLUTION RESPIRATORY (INHALATION) AS NEEDED
Status: CANCELLED
Start: 2022-05-18

## 2022-05-11 RX ORDER — HYDROCORTISONE SODIUM SUCCINATE 100 MG/2ML
100 INJECTION, POWDER, FOR SOLUTION INTRAMUSCULAR; INTRAVENOUS AS NEEDED
Status: CANCELLED | OUTPATIENT
Start: 2022-05-18

## 2022-05-11 RX ORDER — HEPARIN 100 UNIT/ML
300-500 SYRINGE INTRAVENOUS AS NEEDED
Status: CANCELLED
Start: 2022-05-18

## 2022-05-11 RX ORDER — SODIUM CHLORIDE 9 MG/ML
25 INJECTION, SOLUTION INTRAVENOUS CONTINUOUS
Status: CANCELLED | OUTPATIENT
Start: 2022-05-18

## 2022-05-11 RX ORDER — SODIUM CHLORIDE 0.9 % (FLUSH) 0.9 %
10 SYRINGE (ML) INJECTION AS NEEDED
Status: DISPENSED | OUTPATIENT
Start: 2022-05-11 | End: 2022-05-11

## 2022-05-11 RX ORDER — SODIUM CHLORIDE 9 MG/ML
25 INJECTION, SOLUTION INTRAVENOUS CONTINUOUS
Status: DISPENSED | OUTPATIENT
Start: 2022-05-11 | End: 2022-05-11

## 2022-05-11 RX ORDER — ACETAMINOPHEN 325 MG/1
650 TABLET ORAL AS NEEDED
Status: CANCELLED
Start: 2022-05-18

## 2022-05-11 RX ADMIN — SODIUM CHLORIDE 25 ML/HR: 9 INJECTION, SOLUTION INTRAVENOUS at 10:44

## 2022-05-11 RX ADMIN — FERRIC CARBOXYMALTOSE INJECTION 750 MG: 50 INJECTION, SOLUTION INTRAVENOUS at 10:49

## 2022-05-11 NOTE — DISCHARGE INSTRUCTIONS
Patient Education   Ferric Carboxymaltose (By injection)   Ferric Carboxymaltose (YANG-ik patrice-box-ee-MAWL-tose)  Treats anemia (not enough iron in the blood). Brand Name(s): Injectafer   There may be other brand names for this medicine. When This Medicine Should Not Be Used: This medicine is not right for everyone. You should not receive it if you had an allergic reaction to ferric carboxymaltose. How to Use This Medicine:   Injectable  · Your doctor will prescribe your dose and schedule. This medicine is given through a needle placed in a vein. · A nurse or other health provider will give you this medicine. · Read and follow the patient instructions that come with this medicine. Talk to your doctor or pharmacist if you have any questions. Drugs and Foods to Avoid:   Ask your doctor or pharmacist before using any other medicine, including over-the-counter medicines, vitamins, and herbal products. · Some foods and medicines can affect how this medicine works. Tell your doctor if you are also using an iron supplement that you take by mouth. Warnings While Using This Medicine:   · Tell your doctor if you are pregnant or breastfeeding, or if you have liver disease or high blood pressure. Tell your doctor if you have had an allergic reaction to injectable iron products. · Tell any doctor or dentist who treats you that you are using this medicine. This medicine may affect certain medical test results. · Your doctor will do lab tests at regular visits to check on the effects of this medicine. Keep all appointments.   Possible Side Effects While Using This Medicine:   Call your doctor right away if you notice any of these side effects:  · Allergic reaction: Itching or hives, swelling in your face or hands, swelling or tingling in your mouth or throat, chest tightness, trouble breathing  · Chest pain, trouble breathing  · Fast, slow, or pounding heartbeat  · Lightheadedness, dizziness, fainting  If you notice these less serious side effects, talk with your doctor:   · Nausea  · Pain, discoloration, or a bruise under your skin where the needle is placed  If you notice other side effects that you think are caused by this medicine, tell your doctor. Call your doctor for medical advice about side effects. You may report side effects to FDA at 7-570-ITR-1503  © 2017 [facility] Information is for End User's use only and may not be sold, redistributed or otherwise used for commercial purposes. The above information is an  only. It is not intended as medical advice for individual conditions or treatments. Talk to your doctor, nurse or pharmacist before following any medical regimen to see if it is safe and effective for you.

## 2022-05-11 NOTE — PROGRESS NOTES
[address] [facility] Progress Note    Date: May 11, 2022    Name: Ellie Weiner    MRN: [id number]         : 1935      Ms. Alicea was assessed and education was provided. Pt reports feeling very fatigued and knees shaky. She is a caregiver. IV started in the right antecubital without difficulty, brisk blood return. Ms. Cristian Alves vitals were reviewed and patient was observed for 5 minutes prior to treatment. Visit Vitals  BP (!) 177/78 (BP 1 Location: Left arm, BP Patient Position: Sitting)   Pulse 72   Temp (!) 96.6 °F (35.9 °C)   Resp 17   Wt 63 kg (139 lb)   SpO2 99%   Breastfeeding No   BMI 26.26 kg/m²     NS initiated. Injectafer 750 mg given IVP. NS flushing line and stopped. IV removed from site without redness, swelling or pain. Gauze applied and site wrapped with coban. VSS. Ms. Parrish Doshi tolerated the infusion, and had no complaints. Patient armband removed and shredded. Ms. Parrish Doshi was discharged from [place] [address] in stable condition at 1131. She is to return on May 18 at 56 for her next appointment.     Janet Chacko RN  May 11, 2022  11:45 AM

## 2022-05-18 ENCOUNTER — HOSPITAL ENCOUNTER (OUTPATIENT)
Dept: INFUSION THERAPY | Age: 87
Discharge: HOME | End: 2022-05-18
Payer: MEDICARE

## 2022-05-18 VITALS
DIASTOLIC BLOOD PRESSURE: 68 MMHG | OXYGEN SATURATION: 97 % | SYSTOLIC BLOOD PRESSURE: 180 MMHG | TEMPERATURE: 97.5 F | RESPIRATION RATE: 16 BRPM | HEART RATE: 62 BPM | WEIGHT: 141 LBS | BODY MASS INDEX: 26.64 KG/M2

## 2022-05-18 DIAGNOSIS — D50.9 IRON DEFICIENCY ANEMIA, UNSPECIFIED IRON DEFICIENCY ANEMIA TYPE: Primary | ICD-10-CM

## 2022-05-18 PROCEDURE — 96374 THER/PROPH/DIAG INJ IV PUSH: CPT

## 2022-05-18 PROCEDURE — 74011250636 HC RX REV CODE- 250/636: Performed by: INTERNAL MEDICINE

## 2022-05-18 RX ORDER — ACETAMINOPHEN 325 MG/1
650 TABLET ORAL AS NEEDED
Start: 2022-05-25

## 2022-05-18 RX ORDER — SODIUM CHLORIDE 0.9 % (FLUSH) 0.9 %
10 SYRINGE (ML) INJECTION AS NEEDED
OUTPATIENT
Start: 2022-05-25

## 2022-05-18 RX ORDER — SODIUM CHLORIDE 9 MG/ML
25 INJECTION, SOLUTION INTRAVENOUS CONTINUOUS
Status: CANCELLED | OUTPATIENT
Start: 2022-05-25

## 2022-05-18 RX ORDER — HEPARIN 100 UNIT/ML
300-500 SYRINGE INTRAVENOUS AS NEEDED
Start: 2022-05-25

## 2022-05-18 RX ORDER — DIPHENHYDRAMINE HYDROCHLORIDE 50 MG/ML
50 INJECTION, SOLUTION INTRAMUSCULAR; INTRAVENOUS AS NEEDED
Start: 2022-05-25

## 2022-05-18 RX ORDER — ALBUTEROL SULFATE 0.83 MG/ML
2.5 SOLUTION RESPIRATORY (INHALATION) AS NEEDED
Start: 2022-05-25

## 2022-05-18 RX ORDER — LORAZEPAM 0.5 MG/1
0.5 TABLET ORAL AS NEEDED
COMMUNITY

## 2022-05-18 RX ORDER — DIPHENHYDRAMINE HYDROCHLORIDE 50 MG/ML
25 INJECTION, SOLUTION INTRAMUSCULAR; INTRAVENOUS AS NEEDED
Start: 2022-05-25

## 2022-05-18 RX ORDER — SODIUM CHLORIDE 9 MG/ML
25 INJECTION, SOLUTION INTRAVENOUS CONTINUOUS
Status: DISPENSED | OUTPATIENT
Start: 2022-05-18 | End: 2022-05-18

## 2022-05-18 RX ORDER — SODIUM CHLORIDE 9 MG/ML
10 INJECTION INTRAMUSCULAR; INTRAVENOUS; SUBCUTANEOUS AS NEEDED
OUTPATIENT
Start: 2022-05-25

## 2022-05-18 RX ORDER — ONDANSETRON 2 MG/ML
8 INJECTION INTRAMUSCULAR; INTRAVENOUS AS NEEDED
OUTPATIENT
Start: 2022-05-25

## 2022-05-18 RX ORDER — HYDROCORTISONE SODIUM SUCCINATE 100 MG/2ML
100 INJECTION, POWDER, FOR SOLUTION INTRAMUSCULAR; INTRAVENOUS AS NEEDED
OUTPATIENT
Start: 2022-05-25

## 2022-05-18 RX ORDER — EPINEPHRINE 1 MG/ML
0.3 INJECTION, SOLUTION, CONCENTRATE INTRAVENOUS AS NEEDED
OUTPATIENT
Start: 2022-05-25

## 2022-05-18 RX ORDER — DIPHENHYDRAMINE HCL 25 MG
25 CAPSULE ORAL
COMMUNITY

## 2022-05-18 RX ADMIN — SODIUM CHLORIDE 25 ML/HR: 9 INJECTION, SOLUTION INTRAVENOUS at 11:00

## 2022-05-18 RX ADMIN — FERRIC CARBOXYMALTOSE INJECTION 750 MG: 50 INJECTION, SOLUTION INTRAVENOUS at 11:05

## 2022-05-18 NOTE — PROGRESS NOTES
1030 Pt arrived ambulatory and in no distress for Injectafer. Assessment unchanged. No new complaints voiced. IV started. N/S as flush line    Medications:  Injectafer IVP over 10 mins    Patient observed for 30 mins post. BP taken. 1150 Discharged home ambulatory and in no distress. Patient does not need a follow up apt.

## 2022-08-12 ENCOUNTER — HOSPITAL ENCOUNTER (OUTPATIENT)
Dept: MAMMOGRAPHY | Age: 87
Discharge: HOME | End: 2022-08-12
Attending: INTERNAL MEDICINE
Payer: MEDICARE

## 2022-08-12 DIAGNOSIS — Z12.31 VISIT FOR SCREENING MAMMOGRAM: ICD-10-CM

## 2022-08-12 PROCEDURE — 77063 BREAST TOMOSYNTHESIS BI: CPT

## 2023-04-18 ENCOUNTER — HOSPITAL ENCOUNTER (OUTPATIENT)
Dept: INFUSION THERAPY | Age: 88
Discharge: HOME | End: 2023-04-18
Payer: MEDICARE

## 2023-04-18 VITALS
HEART RATE: 66 BPM | WEIGHT: 143.8 LBS | RESPIRATION RATE: 16 BRPM | OXYGEN SATURATION: 97 % | DIASTOLIC BLOOD PRESSURE: 63 MMHG | SYSTOLIC BLOOD PRESSURE: 139 MMHG | TEMPERATURE: 97.7 F | BODY MASS INDEX: 27.17 KG/M2

## 2023-04-18 PROCEDURE — 74011250636 HC RX REV CODE- 250/636: Performed by: INTERNAL MEDICINE

## 2023-04-18 PROCEDURE — 96372 THER/PROPH/DIAG INJ SC/IM: CPT

## 2023-04-18 RX ADMIN — DENOSUMAB 60 MG: 60 INJECTION SUBCUTANEOUS at 13:38

## 2023-04-18 NOTE — PROGRESS NOTES
[facility] Progress Note    Date: 2023    Name: Dao Cain    MRN: [id number]         : 1935      Ms. Alicea was assessed and education was provided. She denies complaints. Denies adverse effect from prior Prolia injections. Ms. Gladys Allison vitals were reviewed and patient was observed for 5 minutes prior to treatment. Visit Vitals  /63 (BP 1 Location: Left upper arm, BP Patient Position: Sitting)   Pulse 66   Temp 97.7 °F (36.5 °C)   Resp 16   Wt 65.2 kg (143 lb 12.8 oz)   SpO2 97%   Breastfeeding No   BMI 27.17 kg/m²     Lab work from 3/30/23 reviewed. Prolia 60 mg was administered subcutaneous in  left upper arm and site intact. Ms. Susie Burnham tolerated well, and had no complaints. Patient armband removed and shredded. Ms. Susie Burnham was discharged from [place] [address] in stable condition at 1340. She is to return on  at 1 pm for her next appointment.     Claudia Chow RN  2023  1:52 PM

## 2023-07-12 NOTE — TELEPHONE ENCOUNTER
Patient called, canceled apt on 5/25/2022. Explained the plain for providers. PT stated that she will f/u with PCP. Informed pt that she still can come to [facility] for labs or treatment. PA visited pt bedside multiple time to attempt to locate them no answer when called, unable to locate pt pt assumed Left wo being seen./

## 2023-09-20 ENCOUNTER — TRANSCRIBE ORDERS (OUTPATIENT)
Facility: HOSPITAL | Age: 88
End: 2023-09-20

## 2023-09-20 DIAGNOSIS — Z12.31 SCREENING MAMMOGRAM FOR BREAST CANCER: Primary | ICD-10-CM

## 2023-10-10 PROBLEM — M81.0 AGE-RELATED OSTEOPOROSIS WITHOUT CURRENT PATHOLOGICAL FRACTURE: Status: ACTIVE | Noted: 2023-10-10

## 2023-10-12 RX ORDER — ALBUTEROL SULFATE 90 UG/1
4 AEROSOL, METERED RESPIRATORY (INHALATION) PRN
OUTPATIENT
Start: 2023-10-12

## 2023-10-12 RX ORDER — ONDANSETRON 2 MG/ML
8 INJECTION INTRAMUSCULAR; INTRAVENOUS
OUTPATIENT
Start: 2023-10-12

## 2023-10-12 RX ORDER — SODIUM CHLORIDE 9 MG/ML
INJECTION, SOLUTION INTRAVENOUS CONTINUOUS
OUTPATIENT
Start: 2023-10-12

## 2023-10-12 RX ORDER — ACETAMINOPHEN 325 MG/1
650 TABLET ORAL
OUTPATIENT
Start: 2023-10-12

## 2023-10-12 RX ORDER — DIPHENHYDRAMINE HYDROCHLORIDE 50 MG/ML
50 INJECTION INTRAMUSCULAR; INTRAVENOUS
OUTPATIENT
Start: 2023-10-12

## 2023-10-12 RX ORDER — EPINEPHRINE 1 MG/ML
0.3 INJECTION, SOLUTION, CONCENTRATE INTRAVENOUS PRN
OUTPATIENT
Start: 2023-10-12

## 2023-10-19 ENCOUNTER — HOSPITAL ENCOUNTER (OUTPATIENT)
Facility: HOSPITAL | Age: 88
Setting detail: INFUSION SERIES
End: 2023-10-19
Payer: MEDICARE

## 2023-10-19 VITALS
SYSTOLIC BLOOD PRESSURE: 186 MMHG | RESPIRATION RATE: 18 BRPM | HEART RATE: 71 BPM | TEMPERATURE: 97.8 F | DIASTOLIC BLOOD PRESSURE: 75 MMHG

## 2023-10-19 DIAGNOSIS — M81.0 AGE-RELATED OSTEOPOROSIS WITHOUT CURRENT PATHOLOGICAL FRACTURE: Primary | ICD-10-CM

## 2023-10-19 PROCEDURE — 6360000002 HC RX W HCPCS: Performed by: INTERNAL MEDICINE

## 2023-10-19 PROCEDURE — 96372 THER/PROPH/DIAG INJ SC/IM: CPT

## 2023-10-19 RX ORDER — ACETAMINOPHEN 325 MG/1
650 TABLET ORAL
OUTPATIENT
Start: 2024-03-15

## 2023-10-19 RX ORDER — SODIUM CHLORIDE 9 MG/ML
INJECTION, SOLUTION INTRAVENOUS CONTINUOUS
OUTPATIENT
Start: 2024-03-15

## 2023-10-19 RX ORDER — DIPHENHYDRAMINE HYDROCHLORIDE 50 MG/ML
50 INJECTION INTRAMUSCULAR; INTRAVENOUS
OUTPATIENT
Start: 2024-03-15

## 2023-10-19 RX ORDER — EPINEPHRINE 1 MG/ML
0.3 INJECTION, SOLUTION, CONCENTRATE INTRAVENOUS PRN
OUTPATIENT
Start: 2024-03-15

## 2023-10-19 RX ORDER — ALBUTEROL SULFATE 90 UG/1
4 AEROSOL, METERED RESPIRATORY (INHALATION) PRN
OUTPATIENT
Start: 2024-03-15

## 2023-10-19 RX ORDER — ONDANSETRON 2 MG/ML
8 INJECTION INTRAMUSCULAR; INTRAVENOUS
OUTPATIENT
Start: 2024-03-15

## 2023-10-19 RX ADMIN — DENOSUMAB 60 MG: 60 INJECTION SUBCUTANEOUS at 13:19

## 2023-10-19 ASSESSMENT — PAIN DESCRIPTION - LOCATION: LOCATION: HIP

## 2023-10-19 ASSESSMENT — PAIN SCALES - GENERAL: PAINLEVEL_OUTOF10: 7

## 2023-10-19 NOTE — DISCHARGE INSTRUCTIONS
denosumab (Prolia)  Pronunciation:  kia ackerman mab  Brand:  Prolia  What is the most important information I should know about Prolia? This medication guide provides information about the Prolia brand of denosumab. [place] Kaden is another brand of denosumab used to prevent bone fractures and other skeletal conditions in people with tumors that have spread to the bone. Prolia can cause many serious side effects. Call your doctor at once if you have a fever, chills, pain or burning when you urinate, severe stomach pain, cough, shortness of breath, skin problems, numbness or tingling, severe or unusual pain, or skin problems. Do not use if you are pregnant. Use effective birth control while using Prolia, and for at least 5 months after you stop. What is denosumab (Prolia)? Denosumab is a monoclonal antibody. Monoclonal antibodies are made to target and destroy only certain cells in the body. This may help to protect healthy cells from damage. The Prolia brand of denosumab is used to treat osteoporosis or bone loss in men and women who have a high risk of bone fracture. Prolia is sometimes used in people whose bone fracture is caused by certain medicines or cancer treatments. This medication guide provides information about the Prolia brand of denosumab. [place] Kaden is another brand of denosumab used to prevent bone fractures and other skeletal conditions in people with tumors that have spread to the bone. Denosumab may also be used for purposes not listed in this medication guide. What should I discuss with my healthcare provider before receiving Prolia? You should not receive Prolia if you are allergic to denosumab, or if you have:  low levels of calcium in your blood (hypocalcemia); or  if you are pregnant. While you are using Prolia, you should not receive Xgeva, another brand of denosumab.   Tell your doctor if you have ever had:  kidney disease (or if you are on dialysis);  a weak immune system (caused by not stop using this medicine without first talking to your doctor. If you keep this medicine at home, store it in the original carton in a refrigerator. Protect from light and do not freeze. Do not shake the prefilled syringe. You may take the carton out of the refrigerator and allow it to reach room temperature before the injection is given. After you have taken Prolia out of the refrigerator, you may keep it at room temperature for up to 14 days. Store in the original container away from heat and light. Each prefilled syringe is for one use only. Throw it away after one use, even if there is still medicine left inside. Use a needle and syringe only once and then place them in a puncture-proof \"sharps\" container. Follow state or local laws about how to dispose of this container. Keep it out of the reach of children and pets. Do not share this medicine with another person, even if they have the same symptoms you have. What happens if I miss a dose? Call your doctor for instructions if you miss a dose or miss an appointment for your Prolia injection. You should receive your missed injection as soon as possible. What happens if I overdose? Seek emergency medical attention or call the Poison Help line at [phone]. What should I avoid while receiving Prolia? Follow your doctor's instructions about any restrictions on food, beverages, or activity. What are the possible side effects of Prolia? Get emergency medical help if you have signs of an allergic reaction: hives, itching, rash; difficult breathing, feeling light-headed; swelling of your face, lips, tongue, or throat.   Call your doctor at once if you have:  new or unusual pain in your thigh, hip, or groin;  severe pain in your joints, muscles, or bones;  skin problems such as dryness, peeling, redness, itching, blisters, bumps, oozing, or crusting; or  low calcium level --muscle spasms or contractions, numbness or tingly feeling (around your

## 2023-10-27 ENCOUNTER — HOSPITAL ENCOUNTER (OUTPATIENT)
Facility: HOSPITAL | Age: 88
End: 2023-10-27
Attending: INTERNAL MEDICINE
Payer: MEDICARE

## 2023-10-27 ENCOUNTER — TRANSCRIBE ORDERS (OUTPATIENT)
Facility: HOSPITAL | Age: 88
End: 2023-10-27

## 2023-10-27 VITALS — BODY MASS INDEX: 27.19 KG/M2 | WEIGHT: 144 LBS | HEIGHT: 61 IN

## 2023-10-27 DIAGNOSIS — M81.0 AGE-RELATED OSTEOPOROSIS WITHOUT CURRENT PATHOLOGICAL FRACTURE: ICD-10-CM

## 2023-10-27 DIAGNOSIS — M25.551 RIGHT HIP PAIN: ICD-10-CM

## 2023-10-27 DIAGNOSIS — M54.10 RADICULAR SYNDROME OF LOWER LIMBS: ICD-10-CM

## 2023-10-27 DIAGNOSIS — Z12.31 SCREENING MAMMOGRAM FOR BREAST CANCER: ICD-10-CM

## 2023-10-27 DIAGNOSIS — M54.10 RADICULAR SYNDROME OF LOWER LIMBS: Primary | ICD-10-CM

## 2023-10-27 PROCEDURE — 77067 SCR MAMMO BI INCL CAD: CPT

## 2023-10-27 PROCEDURE — 72110 X-RAY EXAM L-2 SPINE 4/>VWS: CPT

## 2023-10-27 PROCEDURE — 73502 X-RAY EXAM HIP UNI 2-3 VIEWS: CPT

## 2023-10-27 PROCEDURE — 77080 DXA BONE DENSITY AXIAL: CPT

## 2024-04-18 ENCOUNTER — HOSPITAL ENCOUNTER (OUTPATIENT)
Facility: HOSPITAL | Age: 89
Setting detail: INFUSION SERIES
Discharge: HOME | End: 2024-04-18
Payer: MEDICARE

## 2024-04-18 DIAGNOSIS — M81.0 AGE-RELATED OSTEOPOROSIS WITHOUT CURRENT PATHOLOGICAL FRACTURE: ICD-10-CM

## 2024-04-18 LAB
ALBUMIN SERPL-MCNC: 3.7 G/DL (ref 3.5–5)
ALBUMIN/GLOB SERPL: 1.1 (ref 1.1–2.2)
ALP SERPL-CCNC: 69 U/L (ref 45–117)
ALT SERPL-CCNC: 25 U/L (ref 12–78)
ANION GAP SERPL CALC-SCNC: 7 MMOL/L (ref 5–15)
AST SERPL-CCNC: 20 U/L (ref 15–37)
BILIRUB SERPL-MCNC: 0.3 MG/DL (ref 0.2–1)
BUN SERPL-MCNC: 28 MG/DL (ref 6–20)
BUN/CREAT SERPL: 19 (ref 12–20)
CALCIUM SERPL-MCNC: 9.9 MG/DL (ref 8.5–10.1)
CHLORIDE SERPL-SCNC: 103 MMOL/L (ref 97–108)
CO2 SERPL-SCNC: 30 MMOL/L (ref 21–32)
CREAT SERPL-MCNC: 1.45 MG/DL (ref 0.55–1.02)
GLOBULIN SER CALC-MCNC: 3.5 G/DL (ref 2–4)
GLUCOSE SERPL-MCNC: 92 MG/DL (ref 65–100)
PHOSPHATE SERPL-MCNC: 4.1 MG/DL (ref 2.6–4.7)
POTASSIUM SERPL-SCNC: 4.5 MMOL/L (ref 3.5–5.1)
PROT SERPL-MCNC: 7.2 G/DL (ref 6.4–8.2)
SODIUM SERPL-SCNC: 140 MMOL/L (ref 136–145)

## 2024-04-18 PROCEDURE — 80053 COMPREHEN METABOLIC PANEL: CPT

## 2024-04-18 PROCEDURE — 84100 ASSAY OF PHOSPHORUS: CPT

## 2024-04-18 PROCEDURE — 36415 COLL VENOUS BLD VENIPUNCTURE: CPT

## 2024-04-18 NOTE — PROGRESS NOTES
Patient arrived for Prolia injection appointment. The patient was unaware that she needed lab results prior to her treatment. She is unable to wait for results today and will be rescheduled for 4/19/24@1:30. Labs drawn 4/18/24 from the [facility] with no difficulties. Gauze and tape applied. Labs in process.

## 2024-04-19 ENCOUNTER — HOSPITAL ENCOUNTER (OUTPATIENT)
Facility: HOSPITAL | Age: 89
Setting detail: INFUSION SERIES
Discharge: HOME | End: 2024-04-19
Payer: MEDICARE

## 2024-04-19 VITALS
HEART RATE: 76 BPM | SYSTOLIC BLOOD PRESSURE: 182 MMHG | RESPIRATION RATE: 20 BRPM | BODY MASS INDEX: 29.04 KG/M2 | OXYGEN SATURATION: 95 % | DIASTOLIC BLOOD PRESSURE: 72 MMHG | TEMPERATURE: 98.1 F | WEIGHT: 151.2 LBS

## 2024-04-19 DIAGNOSIS — M81.0 AGE-RELATED OSTEOPOROSIS WITHOUT CURRENT PATHOLOGICAL FRACTURE: Primary | ICD-10-CM

## 2024-04-19 PROCEDURE — 6360000002 HC RX W HCPCS: Performed by: INTERNAL MEDICINE

## 2024-04-19 PROCEDURE — 96372 THER/PROPH/DIAG INJ SC/IM: CPT

## 2024-04-19 RX ORDER — DIPHENHYDRAMINE HYDROCHLORIDE 50 MG/ML
50 INJECTION INTRAMUSCULAR; INTRAVENOUS
Status: DISCONTINUED | OUTPATIENT
Start: 2024-04-19 | End: 2024-04-20 | Stop reason: HOSPADM

## 2024-04-19 RX ORDER — ONDANSETRON 2 MG/ML
8 INJECTION INTRAMUSCULAR; INTRAVENOUS
OUTPATIENT
Start: 2024-10-17

## 2024-04-19 RX ORDER — ACETAMINOPHEN 325 MG/1
650 TABLET ORAL
OUTPATIENT
Start: 2024-10-17

## 2024-04-19 RX ORDER — CANDESARTAN 16 MG/1
8 TABLET ORAL DAILY
COMMUNITY

## 2024-04-19 RX ORDER — EPINEPHRINE 1 MG/ML
0.3 INJECTION, SOLUTION, CONCENTRATE INTRAVENOUS PRN
OUTPATIENT
Start: 2024-10-17

## 2024-04-19 RX ORDER — EPINEPHRINE 1 MG/ML
0.3 INJECTION, SOLUTION, CONCENTRATE INTRAVENOUS PRN
Status: DISCONTINUED | OUTPATIENT
Start: 2024-04-19 | End: 2024-04-20 | Stop reason: HOSPADM

## 2024-04-19 RX ORDER — OXYCODONE HYDROCHLORIDE 5 MG/1
5 CAPSULE ORAL EVERY 4 HOURS PRN
COMMUNITY

## 2024-04-19 RX ORDER — ACETAMINOPHEN 325 MG/1
650 TABLET ORAL
Status: DISCONTINUED | OUTPATIENT
Start: 2024-04-19 | End: 2024-04-20 | Stop reason: HOSPADM

## 2024-04-19 RX ORDER — SODIUM CHLORIDE 9 MG/ML
INJECTION, SOLUTION INTRAVENOUS CONTINUOUS
OUTPATIENT
Start: 2024-10-17

## 2024-04-19 RX ORDER — ALBUTEROL SULFATE 90 UG/1
4 AEROSOL, METERED RESPIRATORY (INHALATION) PRN
Status: DISCONTINUED | OUTPATIENT
Start: 2024-04-19 | End: 2024-04-20 | Stop reason: HOSPADM

## 2024-04-19 RX ORDER — DIPHENHYDRAMINE HYDROCHLORIDE 50 MG/ML
50 INJECTION INTRAMUSCULAR; INTRAVENOUS
OUTPATIENT
Start: 2024-10-17

## 2024-04-19 RX ORDER — ONDANSETRON 2 MG/ML
8 INJECTION INTRAMUSCULAR; INTRAVENOUS
Status: DISCONTINUED | OUTPATIENT
Start: 2024-04-19 | End: 2024-04-20 | Stop reason: HOSPADM

## 2024-04-19 RX ORDER — ALBUTEROL SULFATE 90 UG/1
4 AEROSOL, METERED RESPIRATORY (INHALATION) PRN
OUTPATIENT
Start: 2024-10-17

## 2024-04-19 RX ORDER — SODIUM CHLORIDE 9 MG/ML
INJECTION, SOLUTION INTRAVENOUS CONTINUOUS
Status: DISCONTINUED | OUTPATIENT
Start: 2024-04-19 | End: 2024-04-20 | Stop reason: HOSPADM

## 2024-04-19 RX ADMIN — DENOSUMAB 60 MG: 60 INJECTION SUBCUTANEOUS at 13:54

## 2024-04-19 ASSESSMENT — PAIN SCALES - GENERAL: PAINLEVEL_OUTOF10: 0

## 2024-04-19 NOTE — PROGRESS NOTES
[facility] Progress Note    Date: 2024    Name: Donna Person    MRN: [id number]         : 1935      Ms. Person was assessed and education was provided.     Ms. Person's vitals were reviewed and patient was observed for 5 minutes prior to treatment.   Vitals:    24 1330   BP: (!) 182/72   Pulse: 76   Resp: 20   Temp: 98.1 °F (36.7 °C)   SpO2: 95%       Lab results were obtained and reviewed.  No results found for this or any previous visit (from the past 12 hour(s)).    Prolia 60 mg was administered subcutaneous in  right upper arm.       Ms. Person tolerated well, and had no complaints.  Patient armband removed and shredded    [facility] at 1400. She is to return on 2024 at 1300 for her next appointment.    Mireya Jorge RN  2024  3:53 PM

## 2024-05-31 ENCOUNTER — TRANSCRIBE ORDERS (OUTPATIENT)
Facility: HOSPITAL | Age: 89
End: 2024-05-31

## 2024-05-31 ENCOUNTER — HOSPITAL ENCOUNTER (OUTPATIENT)
Facility: HOSPITAL | Age: 89
End: 2024-05-31
Payer: MEDICARE

## 2024-05-31 DIAGNOSIS — M25.571 RIGHT ANKLE PAIN, UNSPECIFIED CHRONICITY: Primary | ICD-10-CM

## 2024-05-31 DIAGNOSIS — M25.571 RIGHT ANKLE PAIN, UNSPECIFIED CHRONICITY: ICD-10-CM

## 2024-05-31 PROCEDURE — 73610 X-RAY EXAM OF ANKLE: CPT

## 2024-05-31 PROCEDURE — 73630 X-RAY EXAM OF FOOT: CPT

## 2024-10-24 ENCOUNTER — HOSPITAL ENCOUNTER (OUTPATIENT)
Facility: HOSPITAL | Age: 89
Setting detail: INFUSION SERIES
Discharge: HOME | End: 2024-10-24
Payer: MEDICARE

## 2024-10-24 VITALS
OXYGEN SATURATION: 94 % | BODY MASS INDEX: 28.7 KG/M2 | SYSTOLIC BLOOD PRESSURE: 136 MMHG | DIASTOLIC BLOOD PRESSURE: 72 MMHG | RESPIRATION RATE: 17 BRPM | HEART RATE: 74 BPM | TEMPERATURE: 98 F | WEIGHT: 149.4 LBS

## 2024-10-24 DIAGNOSIS — M81.0 AGE-RELATED OSTEOPOROSIS WITHOUT CURRENT PATHOLOGICAL FRACTURE: Primary | ICD-10-CM

## 2024-10-24 PROCEDURE — 6360000002 HC RX W HCPCS: Performed by: INTERNAL MEDICINE

## 2024-10-24 PROCEDURE — 96372 THER/PROPH/DIAG INJ SC/IM: CPT

## 2024-10-24 RX ORDER — ACETAMINOPHEN 325 MG/1
650 TABLET ORAL
Status: DISCONTINUED | OUTPATIENT
Start: 2024-10-24 | End: 2024-10-25 | Stop reason: HOSPADM

## 2024-10-24 RX ORDER — SODIUM CHLORIDE 9 MG/ML
INJECTION, SOLUTION INTRAVENOUS CONTINUOUS
OUTPATIENT
Start: 2025-04-13

## 2024-10-24 RX ORDER — DIPHENHYDRAMINE HYDROCHLORIDE 50 MG/ML
50 INJECTION INTRAMUSCULAR; INTRAVENOUS
Status: DISCONTINUED | OUTPATIENT
Start: 2024-10-24 | End: 2024-10-25 | Stop reason: HOSPADM

## 2024-10-24 RX ORDER — ALBUTEROL SULFATE 90 UG/1
4 INHALANT RESPIRATORY (INHALATION) PRN
OUTPATIENT
Start: 2025-04-13

## 2024-10-24 RX ORDER — ONDANSETRON 2 MG/ML
8 INJECTION INTRAMUSCULAR; INTRAVENOUS
OUTPATIENT
Start: 2025-04-13

## 2024-10-24 RX ORDER — PHENOL 1.4 %
1 AEROSOL, SPRAY (ML) MUCOUS MEMBRANE 2 TIMES DAILY
COMMUNITY

## 2024-10-24 RX ORDER — EPINEPHRINE 1 MG/ML
0.3 INJECTION, SOLUTION, CONCENTRATE INTRAVENOUS PRN
OUTPATIENT
Start: 2025-04-13

## 2024-10-24 RX ORDER — SODIUM CHLORIDE 9 MG/ML
INJECTION, SOLUTION INTRAVENOUS CONTINUOUS
Status: DISCONTINUED | OUTPATIENT
Start: 2024-10-24 | End: 2024-10-25 | Stop reason: HOSPADM

## 2024-10-24 RX ORDER — ALBUTEROL SULFATE 90 UG/1
4 INHALANT RESPIRATORY (INHALATION) PRN
Status: DISCONTINUED | OUTPATIENT
Start: 2024-10-24 | End: 2024-10-25 | Stop reason: HOSPADM

## 2024-10-24 RX ORDER — ACETAMINOPHEN 325 MG/1
650 TABLET ORAL
OUTPATIENT
Start: 2025-04-13

## 2024-10-24 RX ORDER — ONDANSETRON 2 MG/ML
8 INJECTION INTRAMUSCULAR; INTRAVENOUS
Status: DISCONTINUED | OUTPATIENT
Start: 2024-10-24 | End: 2024-10-25 | Stop reason: HOSPADM

## 2024-10-24 RX ORDER — DIPHENHYDRAMINE HYDROCHLORIDE 50 MG/ML
50 INJECTION INTRAMUSCULAR; INTRAVENOUS
OUTPATIENT
Start: 2025-04-13

## 2024-10-24 RX ORDER — EPINEPHRINE 1 MG/ML
0.3 INJECTION, SOLUTION, CONCENTRATE INTRAVENOUS PRN
Status: DISCONTINUED | OUTPATIENT
Start: 2024-10-24 | End: 2024-10-25 | Stop reason: HOSPADM

## 2024-10-24 RX ADMIN — DENOSUMAB 60 MG: 60 INJECTION SUBCUTANEOUS at 13:13

## 2024-10-24 ASSESSMENT — PAIN SCALES - GENERAL: PAINLEVEL_OUTOF10: 0

## 2024-10-24 NOTE — PROGRESS NOTES
[facility] Progress Note    Date: 2024    Name: Donna Person    MRN: [id number]         : 1935      Ms. Person was assessed and education was provided.     Ms. Person's vitals were reviewed and patient was observed for 5 minutes prior to treatment.   Vitals:    10/24/24 1300   BP: 136/72   Pulse: 74   Resp: 17   Temp: 98 °F (36.7 °C)   SpO2: 94%         Prolia 60 mg was administered subcutaneous in  left upper arm.       Ms. Person tolerated well, and had no complaints.  Patient armband removed and shredded    [facility]. [facility] in [facility] condition at 1320. She is to return on 2024 at 1330 for her next appointment.    Mireya Jorge RN  2024  1:49 PM

## 2024-10-29 ENCOUNTER — TRANSCRIBE ORDERS (OUTPATIENT)
Facility: HOSPITAL | Age: 89
End: 2024-10-29

## 2024-10-29 DIAGNOSIS — Z12.31 SCREENING MAMMOGRAM FOR BREAST CANCER: Primary | ICD-10-CM

## 2024-11-17 ENCOUNTER — HOSPITAL ENCOUNTER (EMERGENCY)
Facility: HOSPITAL | Age: 89
Discharge: HOME | End: 2024-11-17
Attending: EMERGENCY MEDICINE
Payer: MEDICARE

## 2024-11-17 VITALS
OXYGEN SATURATION: 97 % | SYSTOLIC BLOOD PRESSURE: 155 MMHG | RESPIRATION RATE: 18 BRPM | BODY MASS INDEX: 27.85 KG/M2 | TEMPERATURE: 97.8 F | HEART RATE: 59 BPM | DIASTOLIC BLOOD PRESSURE: 66 MMHG | WEIGHT: 145 LBS

## 2024-11-17 DIAGNOSIS — E86.0 DEHYDRATION: Primary | ICD-10-CM

## 2024-11-17 DIAGNOSIS — U07.1 COVID-19 VIRUS INFECTION: ICD-10-CM

## 2024-11-17 LAB
ANION GAP SERPL CALC-SCNC: 12 MMOL/L (ref 2–12)
APPEARANCE UR: CLEAR
BACTERIA URNS QL MICRO: NEGATIVE /HPF
BASOPHILS # BLD: 0 K/UL (ref 0–0.1)
BASOPHILS NFR BLD: 1 % (ref 0–1)
BILIRUB UR QL: NEGATIVE
BUN SERPL-MCNC: 38 MG/DL (ref 6–20)
BUN/CREAT SERPL: 21 (ref 12–20)
CALCIUM SERPL-MCNC: 8.7 MG/DL (ref 8.5–10.1)
CHLORIDE SERPL-SCNC: 101 MMOL/L (ref 97–108)
CO2 SERPL-SCNC: 25 MMOL/L (ref 21–32)
COLOR UR: ABNORMAL
CREAT SERPL-MCNC: 1.77 MG/DL (ref 0.55–1.02)
DIFFERENTIAL METHOD BLD: ABNORMAL
EOSINOPHIL # BLD: 0 K/UL (ref 0–0.4)
EOSINOPHIL NFR BLD: 0 % (ref 0–7)
EPITH CASTS URNS QL MICRO: ABNORMAL /LPF
ERYTHROCYTE [DISTWIDTH] IN BLOOD BY AUTOMATED COUNT: 12.7 % (ref 11.5–14.5)
GLUCOSE SERPL-MCNC: 84 MG/DL (ref 65–100)
GLUCOSE UR STRIP.AUTO-MCNC: NEGATIVE MG/DL
HCT VFR BLD AUTO: 30.8 % (ref 35–47)
HGB BLD-MCNC: 10.1 G/DL (ref 11.5–16)
HGB UR QL STRIP: NEGATIVE
IMM GRANULOCYTES # BLD AUTO: 0 K/UL (ref 0–0.04)
IMM GRANULOCYTES NFR BLD AUTO: 0 % (ref 0–0.5)
KETONES UR QL STRIP.AUTO: NEGATIVE MG/DL
LEUKOCYTE ESTERASE UR QL STRIP.AUTO: ABNORMAL
LYMPHOCYTES # BLD: 1.8 K/UL (ref 0.8–3.5)
LYMPHOCYTES NFR BLD: 23 % (ref 12–49)
MAGNESIUM SERPL-MCNC: 1.8 MG/DL (ref 1.6–2.4)
MCH RBC QN AUTO: 32.8 PG (ref 26–34)
MCHC RBC AUTO-ENTMCNC: 32.8 G/DL (ref 30–36.5)
MCV RBC AUTO: 100 FL (ref 80–99)
MONOCYTES # BLD: 1.2 K/UL (ref 0–1)
MONOCYTES NFR BLD: 15 % (ref 5–13)
NEUTS SEG # BLD: 4.8 K/UL (ref 1.8–8)
NEUTS SEG NFR BLD: 61 % (ref 32–75)
NITRITE UR QL STRIP.AUTO: NEGATIVE
NRBC # BLD: 0 K/UL (ref 0–0.01)
NRBC BLD-RTO: 0 PER 100 WBC
PH UR STRIP: 5.5 (ref 5–8)
PLATELET # BLD AUTO: 184 K/UL (ref 150–400)
PMV BLD AUTO: 10.1 FL (ref 8.9–12.9)
POTASSIUM SERPL-SCNC: 4.7 MMOL/L (ref 3.5–5.1)
PROT UR STRIP-MCNC: NEGATIVE MG/DL
RBC # BLD AUTO: 3.08 M/UL (ref 3.8–5.2)
RBC #/AREA URNS HPF: ABNORMAL /HPF (ref 0–5)
S PYO DNA THROAT QL NAA+PROBE: NOT DETECTED
SODIUM SERPL-SCNC: 138 MMOL/L (ref 136–145)
SP GR UR REFRACTOMETRY: 1.01 (ref 1–1.03)
URINE CULTURE IF INDICATED: ABNORMAL
UROBILINOGEN UR QL STRIP.AUTO: 0.2 EU/DL (ref 0.2–1)
WBC # BLD AUTO: 7.9 K/UL (ref 3.6–11)
WBC URNS QL MICRO: ABNORMAL /HPF (ref 0–4)

## 2024-11-17 PROCEDURE — 2580000003 HC RX 258: Performed by: EMERGENCY MEDICINE

## 2024-11-17 PROCEDURE — 85025 COMPLETE CBC W/AUTO DIFF WBC: CPT

## 2024-11-17 PROCEDURE — 83735 ASSAY OF MAGNESIUM: CPT

## 2024-11-17 PROCEDURE — 87086 URINE CULTURE/COLONY COUNT: CPT

## 2024-11-17 PROCEDURE — 96360 HYDRATION IV INFUSION INIT: CPT

## 2024-11-17 PROCEDURE — 99283 EMERGENCY DEPT VISIT LOW MDM: CPT

## 2024-11-17 PROCEDURE — 36415 COLL VENOUS BLD VENIPUNCTURE: CPT

## 2024-11-17 PROCEDURE — 87651 STREP A DNA AMP PROBE: CPT

## 2024-11-17 PROCEDURE — 81001 URINALYSIS AUTO W/SCOPE: CPT

## 2024-11-17 PROCEDURE — 80048 BASIC METABOLIC PNL TOTAL CA: CPT

## 2024-11-17 PROCEDURE — 99284 EMERGENCY DEPT VISIT MOD MDM: CPT

## 2024-11-17 RX ORDER — 0.9 % SODIUM CHLORIDE 0.9 %
500 INTRAVENOUS SOLUTION INTRAVENOUS ONCE
Status: COMPLETED | OUTPATIENT
Start: 2024-11-17 | End: 2024-11-17

## 2024-11-17 RX ADMIN — SODIUM CHLORIDE 500 ML: 9 INJECTION, SOLUTION INTRAVENOUS at 09:43

## 2024-11-17 ASSESSMENT — PAIN - FUNCTIONAL ASSESSMENT: PAIN_FUNCTIONAL_ASSESSMENT: 0-10

## 2024-11-17 ASSESSMENT — PAIN DESCRIPTION - LOCATION: LOCATION: THROAT

## 2024-11-17 ASSESSMENT — LIFESTYLE VARIABLES
HOW OFTEN DO YOU HAVE A DRINK CONTAINING ALCOHOL: NEVER
HOW MANY STANDARD DRINKS CONTAINING ALCOHOL DO YOU HAVE ON A TYPICAL DAY: PATIENT DOES NOT DRINK

## 2024-11-17 ASSESSMENT — PAIN SCALES - GENERAL: PAINLEVEL_OUTOF10: 8

## 2024-11-17 NOTE — ED PROVIDER NOTES
history, past surgical history, family history and social history.    Vital Signs-Reviewed the patient's vital signs.  Patient Vitals for the past 12 hrs:   Temp Pulse Resp BP SpO2   11/17/24 1015 -- 59 -- (!) 155/66 97 %   11/17/24 1000 -- 59 -- (!) 159/55 98 %   11/17/24 0914 97.8 °F (36.6 °C) 74 18 (!) 159/62 97 %           Records Reviewed: Nursing notes reviewed    Provider Notes (Medical Decision Making):   DDX: Well-appearing 89-year-old female who is currently COVID-positive with sore throat.  Afebrile here.  No tachycardia.  Facility was concerned for possible dehydration due to her sore throat and not eating or drinking as much as usual.  Will obtain basic labs to rule out any electrolyte abnormalities, OLI.  Will hydrate with IV fluids.    ED Course:   Initial assessment performed. The patients presenting problems have been discussed, and they are in agreement with the care plan formulated and outlined with them.  I have encouraged them to ask questions as they arise throughout their visit.           PROGRESS NOTE          Pt reevaluated.  Only mild increase in creatinine from baseline of 1.45.  Currently 1.77 with BUN of 38.  High BUN/creatinine ratio suggesting dehydration.  Hydrated with IV fluids.  Electrolytes normal.  No increased WBC at 7.9.  Urinalysis negative.  Discharged back to assisted living facility encourage p.o. fluid intake.  Written by Edwin Sun MD     Progress note:    Pt  ready for discharge. Updated pt and/or family on all final lab and/or  imaging findings.  Will follow up as instructed. All questions have been answered, pt voiced understanding and agreement with plan.             Specific return precautions provided as well as instructions to return to the ED should sx worsen at any time. Vital signs stable for discharge.     I have also put together some discharge instructions for them that include: 1) educational information regarding their diagnosis, 2) how to care for

## 2024-11-17 NOTE — ED TRIAGE NOTES
Pt arrived with c/o a sore throat that has been going on since Friday. She was sent her by her PCP Ludmila. Pt is COVID positive and states the only other symptom she has is a cough with productive green-yellow mucous

## 2024-11-18 LAB
BACTERIA SPEC CULT: NORMAL
SERVICE CMNT-IMP: NORMAL

## 2024-12-23 ENCOUNTER — HOSPITAL ENCOUNTER (OUTPATIENT)
Facility: HOSPITAL | Age: 88
Discharge: HOME | End: 2024-12-26
Attending: INTERNAL MEDICINE
Payer: MEDICARE

## 2024-12-23 DIAGNOSIS — Z12.31 SCREENING MAMMOGRAM FOR BREAST CANCER: ICD-10-CM

## 2024-12-23 PROCEDURE — 77063 BREAST TOMOSYNTHESIS BI: CPT

## 2025-02-03 ENCOUNTER — TRANSCRIBE ORDERS (OUTPATIENT)
Facility: HOSPITAL | Age: 89
End: 2025-02-03

## 2025-02-03 DIAGNOSIS — R10.9 ABDOMINAL PAIN, UNSPECIFIED ABDOMINAL LOCATION: Primary | ICD-10-CM

## 2025-02-13 ENCOUNTER — HOSPITAL ENCOUNTER (OUTPATIENT)
Facility: HOSPITAL | Age: 89
Discharge: HOME | End: 2025-02-16
Attending: INTERNAL MEDICINE
Payer: MEDICARE

## 2025-02-13 DIAGNOSIS — R10.9 ABDOMINAL PAIN, UNSPECIFIED ABDOMINAL LOCATION: ICD-10-CM

## 2025-02-13 PROCEDURE — A9577 INJ MULTIHANCE: HCPCS | Performed by: INTERNAL MEDICINE

## 2025-02-13 PROCEDURE — 74183 MRI ABD W/O CNTR FLWD CNTR: CPT

## 2025-02-13 PROCEDURE — 6360000004 HC RX CONTRAST MEDICATION: Performed by: INTERNAL MEDICINE

## 2025-02-13 RX ADMIN — GADOBENATE DIMEGLUMINE 13 ML: 529 INJECTION, SOLUTION INTRAVENOUS at 10:47

## 2025-04-24 ENCOUNTER — HOSPITAL ENCOUNTER (OUTPATIENT)
Facility: HOSPITAL | Age: 89
Setting detail: INFUSION SERIES
Discharge: HOME | End: 2025-04-24
Payer: MEDICARE

## 2025-04-24 VITALS
DIASTOLIC BLOOD PRESSURE: 72 MMHG | HEART RATE: 67 BPM | SYSTOLIC BLOOD PRESSURE: 131 MMHG | RESPIRATION RATE: 18 BRPM | TEMPERATURE: 97.7 F | OXYGEN SATURATION: 96 %

## 2025-04-24 DIAGNOSIS — M81.0 AGE-RELATED OSTEOPOROSIS WITHOUT CURRENT PATHOLOGICAL FRACTURE: Primary | ICD-10-CM

## 2025-04-24 LAB
ALBUMIN SERPL-MCNC: 3.5 G/DL (ref 3.5–5)
ALBUMIN/GLOB SERPL: 1 (ref 1.1–2.2)
ALP SERPL-CCNC: 59 U/L (ref 45–117)
ALT SERPL-CCNC: 23 U/L (ref 12–78)
ANION GAP SERPL CALC-SCNC: 5 MMOL/L (ref 2–12)
AST SERPL-CCNC: 18 U/L (ref 15–37)
BILIRUB SERPL-MCNC: 0.3 MG/DL (ref 0.2–1)
BUN SERPL-MCNC: 38 MG/DL (ref 6–20)
BUN/CREAT SERPL: 25 (ref 12–20)
CALCIUM SERPL-MCNC: 10.1 MG/DL (ref 8.5–10.1)
CHLORIDE SERPL-SCNC: 102 MMOL/L (ref 97–108)
CO2 SERPL-SCNC: 30 MMOL/L (ref 21–32)
CREAT SERPL-MCNC: 1.55 MG/DL (ref 0.55–1.02)
GLOBULIN SER CALC-MCNC: 3.5 G/DL (ref 2–4)
GLUCOSE SERPL-MCNC: 93 MG/DL (ref 65–100)
POTASSIUM SERPL-SCNC: 4.7 MMOL/L (ref 3.5–5.1)
PROT SERPL-MCNC: 7 G/DL (ref 6.4–8.2)
SODIUM SERPL-SCNC: 137 MMOL/L (ref 136–145)

## 2025-04-24 PROCEDURE — 80053 COMPREHEN METABOLIC PANEL: CPT

## 2025-04-24 PROCEDURE — 96372 THER/PROPH/DIAG INJ SC/IM: CPT

## 2025-04-24 PROCEDURE — 36415 COLL VENOUS BLD VENIPUNCTURE: CPT

## 2025-04-24 PROCEDURE — 6360000002 HC RX W HCPCS: Performed by: INTERNAL MEDICINE

## 2025-04-24 RX ORDER — ACETAMINOPHEN 325 MG/1
650 TABLET ORAL
Status: DISCONTINUED | OUTPATIENT
Start: 2025-04-24 | End: 2025-04-25 | Stop reason: HOSPADM

## 2025-04-24 RX ORDER — EPINEPHRINE 1 MG/ML
0.3 INJECTION, SOLUTION, CONCENTRATE INTRAVENOUS PRN
OUTPATIENT
Start: 2025-10-23

## 2025-04-24 RX ORDER — SODIUM CHLORIDE 9 MG/ML
INJECTION, SOLUTION INTRAVENOUS CONTINUOUS
OUTPATIENT
Start: 2025-10-23

## 2025-04-24 RX ORDER — HYDROCORTISONE SODIUM SUCCINATE 100 MG/2ML
100 INJECTION INTRAMUSCULAR; INTRAVENOUS
Status: DISCONTINUED | OUTPATIENT
Start: 2025-04-24 | End: 2025-04-25 | Stop reason: HOSPADM

## 2025-04-24 RX ORDER — EPINEPHRINE 1 MG/ML
0.3 INJECTION, SOLUTION, CONCENTRATE INTRAVENOUS PRN
Status: DISCONTINUED | OUTPATIENT
Start: 2025-04-24 | End: 2025-04-25 | Stop reason: HOSPADM

## 2025-04-24 RX ORDER — ACETAMINOPHEN 325 MG/1
650 TABLET ORAL
OUTPATIENT
Start: 2025-10-23

## 2025-04-24 RX ORDER — ALBUTEROL SULFATE 90 UG/1
4 INHALANT RESPIRATORY (INHALATION) PRN
OUTPATIENT
Start: 2025-10-23

## 2025-04-24 RX ORDER — ALBUTEROL SULFATE 90 UG/1
4 INHALANT RESPIRATORY (INHALATION) PRN
Status: DISCONTINUED | OUTPATIENT
Start: 2025-04-24 | End: 2025-04-25 | Stop reason: HOSPADM

## 2025-04-24 RX ORDER — ONDANSETRON 2 MG/ML
8 INJECTION INTRAMUSCULAR; INTRAVENOUS
Status: DISCONTINUED | OUTPATIENT
Start: 2025-04-24 | End: 2025-04-25 | Stop reason: HOSPADM

## 2025-04-24 RX ORDER — DIPHENHYDRAMINE HYDROCHLORIDE 50 MG/ML
50 INJECTION, SOLUTION INTRAMUSCULAR; INTRAVENOUS
Status: DISCONTINUED | OUTPATIENT
Start: 2025-04-24 | End: 2025-04-25 | Stop reason: HOSPADM

## 2025-04-24 RX ORDER — SODIUM CHLORIDE 9 MG/ML
INJECTION, SOLUTION INTRAVENOUS CONTINUOUS
Status: DISCONTINUED | OUTPATIENT
Start: 2025-04-24 | End: 2025-04-25 | Stop reason: HOSPADM

## 2025-04-24 RX ORDER — ONDANSETRON 2 MG/ML
8 INJECTION INTRAMUSCULAR; INTRAVENOUS
OUTPATIENT
Start: 2025-10-23

## 2025-04-24 RX ORDER — DIPHENHYDRAMINE HYDROCHLORIDE 50 MG/ML
50 INJECTION, SOLUTION INTRAMUSCULAR; INTRAVENOUS
OUTPATIENT
Start: 2025-10-23

## 2025-04-24 RX ORDER — HYDROCORTISONE SODIUM SUCCINATE 100 MG/2ML
100 INJECTION INTRAMUSCULAR; INTRAVENOUS
OUTPATIENT
Start: 2025-10-23

## 2025-04-24 RX ADMIN — DENOSUMAB 60 MG: 60 INJECTION SUBCUTANEOUS at 15:01

## 2025-04-24 ASSESSMENT — PAIN SCALES - GENERAL: PAINLEVEL_OUTOF10: 0

## 2025-04-24 NOTE — PROGRESS NOTES
[facility] Progress Note    Date: 2025    Name: Donna Person    MRN: [id number]         : 1935      Ms. Person was assessed and education was provided.     Ms. Person's vitals were reviewed and patient was observed for 5 minutes prior to treatment.   Vitals:    25 1318   BP: 131/72   Pulse: 67   Resp: 18   Temp: 97.7 °F (36.5 °C)   SpO2: 96%       Lab results were obtained and reviewed.  Recent Results (from the past 12 hours)   Comprehensive metabolic panel    Collection Time: 25  1:27 PM   Result Value Ref Range    Sodium 137 136 - 145 mmol/L    Potassium 4.7 3.5 - 5.1 mmol/L    Chloride 102 97 - 108 mmol/L    CO2 30 21 - 32 mmol/L    Anion Gap 5 2 - 12 mmol/L    Glucose 93 65 - 100 mg/dL    BUN 38 (H) 6 - 20 MG/DL    Creatinine 1.55 (H) 0.55 - 1.02 MG/DL    BUN/Creatinine Ratio 25 (H) 12 - 20      Est, Glom Filt Rate 32 (L) >60 ml/min/1.73m2    Calcium 10.1 8.5 - 10.1 MG/DL    Total Bilirubin 0.3 0.2 - 1.0 MG/DL    ALT 23 12 - 78 U/L    AST 18 15 - 37 U/L    Alk Phosphatase 59 45 - 117 U/L    Total Protein 7.0 6.4 - 8.2 g/dL    Albumin 3.5 3.5 - 5.0 g/dL    Globulin 3.5 2.0 - 4.0 g/dL    Albumin/Globulin Ratio 1.0 (L) 1.1 - 2.2         Prolia 60 mg was administered subcutaneous in the LUE.        Ms. Person tolerated well, and had no complaints.  Patient armband removed and placed in the shredder.    [facility] at 3:05. She is to return on 10/27/25 at 1:00 for her next appointment.    Jayla Araya RN  2025  3:09 PM

## 2025-08-12 ENCOUNTER — TRANSCRIBE ORDERS (OUTPATIENT)
Facility: HOSPITAL | Age: 89
End: 2025-08-12

## 2025-08-12 DIAGNOSIS — K86.89 OTHER SPECIFIED DISEASES OF PANCREAS: Primary | ICD-10-CM

## 2025-08-21 ENCOUNTER — HOSPITAL ENCOUNTER (OUTPATIENT)
Facility: HOSPITAL | Age: 89
Discharge: HOME | End: 2025-08-24
Attending: INTERNAL MEDICINE
Payer: MEDICARE

## 2025-08-21 DIAGNOSIS — K86.89 OTHER SPECIFIED DISEASES OF PANCREAS: ICD-10-CM

## 2025-08-21 PROCEDURE — 74183 MRI ABD W/O CNTR FLWD CNTR: CPT

## 2025-08-21 PROCEDURE — A9577 INJ MULTIHANCE: HCPCS | Performed by: INTERNAL MEDICINE

## 2025-08-21 PROCEDURE — 6360000004 HC RX CONTRAST MEDICATION: Performed by: INTERNAL MEDICINE

## 2025-08-21 RX ADMIN — GADOBENATE DIMEGLUMINE 15 ML: 529 INJECTION, SOLUTION INTRAVENOUS at 10:29
